# Patient Record
Sex: FEMALE | Race: WHITE
[De-identification: names, ages, dates, MRNs, and addresses within clinical notes are randomized per-mention and may not be internally consistent; named-entity substitution may affect disease eponyms.]

---

## 2021-01-19 NOTE — R.PREADM
PRE-ADMISSION SCREENING FORM



SCREENING DATE AND TIME



2021 10:12 (CST) 



ANTICIPATED REHAB ADMISSION DATE



2021 



REFERRING FACILITY



[facility] 



REFERRAL DATE AND TIME



2021 10:12 (CST) 



REFERRAL OFFICE PHONE



[phone] 



ACUTE ADMIT DATE



2021 





Previous Rehabilitation(s):





No.



ACUTE /DC PLANNER



AVANI REYNA 



ATTENDING PHYSICIAN



RODOLFO WISE 



REFERRING PHYSICIAN



RODOLFO WISE 



PRIMARY CARE PHYSICIAN



RODOLFO WISE



REHAB FACILITY



[facility] 



CLINICAL LIAISON



Noemy Ram 



PHYSICIAN REVIEWER



Dr. Jordy Lomeli M.D. 



MR#



[id number] 



ACCT#



[id number] 



NAME



BEHNKE, MARY 



ADDRESS



[address] 



PHONE



( 



ZIP



[id number] 



DATE OF BIRTH



1940 



AGE



80 



SSN#



[id number] 



GENDER



female 



MARITAL STATUS



 



RACE



unknown race 



ADMIT FROM



02 - [facility] [[email or web address] 



PRE-HOSPITAL LIVING SETTING



01 - Home (private home/apt. board/care, assisted living, group home, transitional living) 



HOME TYPE AND DETAILS



Type of home: single family house

# of levels in the residence: 1

# of steps within the residence: 0

# of steps to enter the residence: 0



PRE-HOSPITAL LIVING WITH



Family/Relatives 



FAMILY SUPPORT



Yes 



PRIMARY FAMILY CONTACT NAME



CHANEL BANEGAS 



PRIMARY FAMILY CONTACT PHONE



[phone] 



PRIMARY FAMILY CONTACT RELATIONSHIP



DAUGHTER 



PHONE PRIMARY FAMILY CONTACT ON ADM.?



no 



IS PRIMARY FAMILY CONTACT AUTH. REP.?



no 



1ST EMERGENCY CONTACT



CHANEL BANEGAS 



1ST CONTACT PHONE



[phone] 



1ST CONTACT RELATIONSHIP



DAUGHTER 



PHONE 1ST CONTACT ON ADM.



no 



IS 1ST CONTACT AUTH. REP.?



no 



PHONE 2ND CONTACT ON ADM.?



no 



PATIENT EMPLOYMENT STATUS



Retired (for age) 



PATIENT EMPLOYER



No Employer 



PAYOR INFORMATION:



1ST PAYOR NAME



[employer] 



1ST PAYOR PHONE



[phone] 



1ST PAYOR POLICY ID



[id number] 



INJURY/ILLNESS DUE TO ACCIDENT?



No 



ANOTHER PARTY RESPONSIBLE?



No 



PRIMARY REHAB/ACUTE DIAGNOSIS:



Post COVID, falls, debilitated and parkinsons exas.



ONSET DATE



2021



REHAB IMPAIRMENT CATEGORY (JENNIFER):



00 No JENNIFER does NOT meet 60% rule



PRIMARY DIAGNOSIS-RELATED SURGERIES:



N/A



SUMMARY OF ACUTE HOSPITALIZATION:



Pt. is a 79 yo Right-handed female of unknown race.

On 2021 she was admitted to [facility] with diagnosis Post COVID, falls, debilitated an
d parkinsons exas..

Her impairment category is Pulmonary Disorders 10 -  Other Pulmonary Disorders (10.9).

Pre-morbidly, Pt. was independent/mod-I in Safety Awareness, Balance, Self-Care, and Endurance; and s
he had good Locomotion and Sphincter Control.

Currently, she has deficits of Locomotion, Safety Awareness, Balance, Endurance, and Sphincter Contro
l.

Pt. is now referred to [facility] for acute in-patient rehabilitation in order
 to maximize patient's functional independence in activities of daily living, strength, ROM, and mobi
lity.

Patient has realistic goal of being discharged at assistance level 7-Ind to reside at Home with  Fami
ly/Relatives.

Mary Behnke is a 87 year- old female that lives at home

independently. She lives in a single story home with

family help when needed. She has a history Parkinsonism, chronic kidney

disease, diabetes, dis degeneration,spinal stenosis, lumbar region w out

neurogenic vonda, and is dependant on dialysis along w having covid -19 (21).

COVID-19 has made her weak and not able to get out and do her normal

activivites. She has become weak and had multiple falls. She has been working with

home health therapy to [facility] but its not enough at this time.The

patient would most definitely benefit from acute inpatient rehab and has become

severely debilitated and unable to live at her prior level of activity at home

getting her stronger to be back living at home independently is our goal. It is

reasonable and necessary for the patient to come to acute inpatient rehab for

approximately 7-10 days in order to return to her prior level of care. She is

now being transferred to [facility] Inpatient rehabilitation and is

medically stable with relatively stable labs.

She is now medically stable but in need of 24  hour nursing, doctor

supervision and oversight while receiving active and ongoing intensive (PT, OT



therapy a day/15 hours per week and receive care with intensive

interdisciplinary approach. COVID-19 screening performed; spoke with patient

via phone. Patient denies new onset of fever, cough, difficulty breathing, sore

throat, body aches and non-allergy nasal congestion in the past 24 hours.

Patient denies travel outside of [place] in the past 14 days. Patient denies any

contact with someone who has a confirmed diagnosis of or is under investigation

for COVID-19 in the past 14 days.



PAST MEDICAL HISTORY



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

Hypertensive chronic kidney disease with stage 1 through stage 4 chronic kidney disease, or unspecifi
ed chronic kidney disease (I12.9)

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

Chronic kidney disease, stage 4 (severe) (N18.4)

Other cervical disc degeneration, unspecified cervical region (M50.30)

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

DEPENDENCE ON RENAL DIALYSIS

LONG TERM (CURRENT)  USE OF    ORAL HYPOGLYCEMIC DRUGS

COVID- 19                21/



MEDICATION ALLERGIES:



PCN

SULFA



ENVIRONMENTAL ALLERGIES:



- Substance Allergies

None Known

- Other Allergies

None Known



CODE STATUS:



Full code



BMI



N/A 



DIET:



- Diet Type

Regular

- Diet - Solid Texture

Regular

- Diet - Liquid Texture

Regular

- Tube Feed

N/A



REVIEW OF SYSTEMS:



- Gen

Alert and awake

Lying in bed

No apparent distress

Oriented to: person, time, and place

- Vital Signs

SBP/DBP: 132/70

Pulse: 74

Resp: 18

Vital signs stable, afebrile

- CVS

RRR



VITAL SIGNS



SBP/DBP: 132/70

Pulse: 74

Resp: 18

Vital signs stable, afebrile



MEDICATIONS/TREATMENT:



Other-  See attached MAR (Medication Administration Record).



CURRENT SPHINCTER CONTROL:



Pre-hospital bladder status: unspecified

# of bladder accidents in the last 7 days prior to screenin

Pre-hospital bowel status: unspecified

# of bowel accidents in the last 7 days prior to screenin

Last Bowel Movement Date: 2021



CURRENT LOCOMOTION STATUS:



distance walked 40  feet WITH ROLLING WALKER



DETAILED CURRENT FUNCTIONAL STATUS:



- Bladder

accident frequency: Ind - No accidents in the past 7 days

- Bowel

accident frequency: Ind - No accidents in the past 7 days

- Walking

score based on distance walked: 0(N/A)

score based on distance walked: 1(<=50ft)

- Wheelchair

score based on distance traveled: 0(N/A)



QI SCORES:



- Self-Care

A. Eating  03-Partial/moderate assistance  

B. Oral hygiene  03-Partial/moderate assistance  

C. Toileting hygiene  03-Partial/moderate assistance  

E. Shower/bathe self  03-Partial/moderate assistance  

F. Upper body dressing  03-Partial/moderate assistance  

G. Lower body dressing  03-Partial/moderate assistance  

H. Putting on/taking off footwear  88-Not attempted due to medical condition or safety concerns  

- Mobility

A. Roll left and right  03-Partial/moderate assistance  

B. Sit to lying  03-Partial/moderate assistance  

C. Lying to sitting on side of bed  03-Partial/moderate assistance  

D. Sit to stand  03-Partial/moderate assistance  

E. Chair/bed-to-chair transfer  03-Partial/moderate assistance  

F. Toilet transfer  03-Partial/moderate assistance  

G. Car transfer  88-Not attempted due to medical condition or safety concerns  

I. Walk 10 feet  03-Partial/moderate assistance  

J. Walk 50 feet with two turns  88-Not attempted due to medical condition or safety concerns  

K. Walk 150 feet  88-Not attempted due to medical condition or safety concerns  

L. Walking 10 feet on uneven surfaces  88-Not attempted due to medical condition or safety concerns  


M. 1 step (curb)  88-Not attempted due to medical condition or safety concerns  

N. 4 steps  88-Not attempted due to medical condition or safety concerns  

O. 12 steps  88-Not attempted due to medical condition or safety concerns  

P. Picking up object  88-Not attempted due to medical condition or safety concerns  

R. Wheel 50 feet with two turns  88-Not attempted due to medical condition or safety concerns  

S. Wheel 150 feet  88-Not attempted due to medical condition or safety concerns  

- Bladder and Bowel

Bladder continence      

Bowel continence      

- Endurance

Fair

- Balance

Fair

- Safety Awareness

Fair



CURRENT FUNC. DEFICITS:



Self-Care, Mobility, Endurance, Balance, and Safety Awareness



CURRENT / PREVIOUS ASSISTIVE DEVICES:



Rolling Walker





HISTORY OF FALLS. HAS THE PATIENT HAD TWO OR MORE FALLS IN THE PAST YEAR OR ANY FALL WITH INJURY IN T
HE PAST YEAR?:



Yes 



PRIOR SURGERY. DID THE PATIENT HAVE MAJOR SURGERY DURING  DAYS PRIOR TO ADMISSION?:



No 



THERAPY NOTES FROM ACUTE CARE:



Attached.



SPECIAL NEEDS:



- Safety Concerns

Skin breakdown precautions needed due to skin breakdown risk



PATIENT NEEDS ACTIVE AND ONGOING THERAPEUTIC INTERVENTION OF MULTIPLE THERAPY DISCIPLINES, INCLUDING:




- Dietary and Nutrition

Adequate Nutrition. Nutritional Education. Nutritional Supplements. 



- Speech Therapy

Memory Strategies. Speech Intelligibility Training. 



PATIENT NEEDS CLOSE MEDICAL SUPERVISION BY A REHABILITATION PHYSICIAN FOR:



Coordination of Treatment Team



PATIENT REQUIRES 24X7 REHAB NURSING FOR MEDICAL AND FUNCTIONAL MGT. OF THE FOLLOWING DEFICITS:



Disease Management

Medication Management

Patient/Family Education

Providing Safe Environment



PATIENT REQUIRES INTENSIVE, COORDINATED INTERDISCIPLINARY APPROACH TO REHAB:



Arranging Home Equipment/Services

Discharge Planning

Family Intervention/Training

/Case Management



PATIENT REHAB POTENTIAL:



M. BEHNKE is able and expected to receive 3 hours of individualized therapy daily on at least 5 of ev
marlen 7 days

M. BEHNKE's prognosis for significant practical improvement within a reasonable period of time appear
s Good

Expected level of measurable improvement will be of a practical value to M. BEHNKE's functional capac
ity or adaptations to impairments

Has a viable Discharge Plan

Medically appropriate; condition is sufficiently stable to participate in intensive rehab program



DISCHARGE PLAN:



- Estimated Length of Stay (days)

14. 



- Consensus on plan

Discharge plan has been discussed with primary caregiver. Patient/Family is in agreement with the samra
n. Primary caregiver is in agreement with the plan. 



- Patient/Family Goals

Return home independently. 



- Planned Living Setting Upon Discharge

Home, to live with Family/Relatives. Transitional Living. 



RECOMMENDED CARE LEVEL:



IRF



RECOMMENDATION DETAILS:



Recommended Admission to Comprehensive Rehabilitation Program to Increase Functional [place]



SCREENER'S COMPLETENESS CONFIRMATION:



- Screening Confirmation

The patient data collection on this preadmission screening form is finished



PHYSICIANS REVIEW AND ADMISSION DETERMINATION



Admit - Based on my review of the Pre-Admission Screening results, in my medical judgment and experie
nce, I concur with the findings and recommend admission to [facility], as this
 patient requires an IRF level of care.



SIGNATURE PANEL:



 - [electronically] signed by Noemy Ram on 2021 at 11:55 (CST)

 - [electronically] signed by Ryan Pearl PT on 2021 at 12:04 (CST)

Physician Reviewer - [electronically] signed by Dr. Jordy Lomeli M.D. on 2021 at 17:19 (CST
)

## 2021-01-20 ENCOUNTER — HOSPITAL ENCOUNTER (INPATIENT)
Dept: HOSPITAL 97 - 5TH | Age: 81
LOS: 11 days | Discharge: HOME HEALTH | DRG: 948 | End: 2021-01-31
Attending: PSYCHIATRY & NEUROLOGY | Admitting: PSYCHIATRY & NEUROLOGY
Payer: COMMERCIAL

## 2021-01-20 VITALS — BODY MASS INDEX: 23.3 KG/M2

## 2021-01-20 DIAGNOSIS — Z79.82: ICD-10-CM

## 2021-01-20 DIAGNOSIS — F05: ICD-10-CM

## 2021-01-20 DIAGNOSIS — N17.9: ICD-10-CM

## 2021-01-20 DIAGNOSIS — G21.4: ICD-10-CM

## 2021-01-20 DIAGNOSIS — E11.22: ICD-10-CM

## 2021-01-20 DIAGNOSIS — Z20.822: ICD-10-CM

## 2021-01-20 DIAGNOSIS — R53.81: Primary | ICD-10-CM

## 2021-01-20 DIAGNOSIS — Z79.890: ICD-10-CM

## 2021-01-20 DIAGNOSIS — N18.9: ICD-10-CM

## 2021-01-20 DIAGNOSIS — Z88.1: ICD-10-CM

## 2021-01-20 DIAGNOSIS — E87.3: ICD-10-CM

## 2021-01-20 DIAGNOSIS — Z79.84: ICD-10-CM

## 2021-01-20 DIAGNOSIS — E78.5: ICD-10-CM

## 2021-01-20 DIAGNOSIS — Z79.899: ICD-10-CM

## 2021-01-20 DIAGNOSIS — Z88.0: ICD-10-CM

## 2021-01-20 DIAGNOSIS — D63.1: ICD-10-CM

## 2021-01-20 DIAGNOSIS — Z86.16: ICD-10-CM

## 2021-01-20 DIAGNOSIS — E11.40: ICD-10-CM

## 2021-01-20 DIAGNOSIS — Z99.2: ICD-10-CM

## 2021-01-20 DIAGNOSIS — I12.9: ICD-10-CM

## 2021-01-20 LAB — UA DIPSTICK W REFLEX MICRO PNL UR: (no result)

## 2021-01-20 PROCEDURE — 97110 THERAPEUTIC EXERCISES: CPT

## 2021-01-20 PROCEDURE — 90935 HEMODIALYSIS ONE EVALUATION: CPT

## 2021-01-20 PROCEDURE — 85025 COMPLETE CBC W/AUTO DIFF WBC: CPT

## 2021-01-20 PROCEDURE — 87340 HEPATITIS B SURFACE AG IA: CPT

## 2021-01-20 PROCEDURE — 97112 NEUROMUSCULAR REEDUCATION: CPT

## 2021-01-20 PROCEDURE — 83735 ASSAY OF MAGNESIUM: CPT

## 2021-01-20 PROCEDURE — 97530 THERAPEUTIC ACTIVITIES: CPT

## 2021-01-20 PROCEDURE — 71045 X-RAY EXAM CHEST 1 VIEW: CPT

## 2021-01-20 PROCEDURE — 81015 MICROSCOPIC EXAM OF URINE: CPT

## 2021-01-20 PROCEDURE — 86317 IMMUNOASSAY INFECTIOUS AGENT: CPT

## 2021-01-20 PROCEDURE — 81003 URINALYSIS AUTO W/O SCOPE: CPT

## 2021-01-20 PROCEDURE — 97116 GAIT TRAINING THERAPY: CPT

## 2021-01-20 PROCEDURE — 82947 ASSAY GLUCOSE BLOOD QUANT: CPT

## 2021-01-20 PROCEDURE — 36415 COLL VENOUS BLD VENIPUNCTURE: CPT

## 2021-01-20 PROCEDURE — 87186 SC STD MICRODIL/AGAR DIL: CPT

## 2021-01-20 PROCEDURE — 80048 BASIC METABOLIC PNL TOTAL CA: CPT

## 2021-01-20 PROCEDURE — 82040 ASSAY OF SERUM ALBUMIN: CPT

## 2021-01-20 PROCEDURE — 87086 URINE CULTURE/COLONY COUNT: CPT

## 2021-01-20 PROCEDURE — 87077 CULTURE AEROBIC IDENTIFY: CPT

## 2021-01-20 PROCEDURE — 84134 ASSAY OF PREALBUMIN: CPT

## 2021-01-20 PROCEDURE — 97542 WHEELCHAIR MNGMENT TRAINING: CPT

## 2021-01-20 PROCEDURE — 97127: CPT

## 2021-01-20 PROCEDURE — 86709 HEPATITIS A IGM ANTIBODY: CPT

## 2021-01-20 PROCEDURE — 87088 URINE BACTERIA CULTURE: CPT

## 2021-01-20 PROCEDURE — 97161 PT EVAL LOW COMPLEX 20 MIN: CPT

## 2021-01-20 PROCEDURE — 92523 SPEECH SOUND LANG COMPREHEN: CPT

## 2021-01-20 RX ADMIN — Medication SCH CAP: at 22:41

## 2021-01-20 RX ADMIN — ATORVASTATIN CALCIUM SCH MG: 20 TABLET, FILM COATED ORAL at 22:40

## 2021-01-20 RX ADMIN — MEMANTINE HYDROCHLORIDE SCH MG: 10 TABLET ORAL at 22:41

## 2021-01-20 RX ADMIN — Medication SCH CAP: at 22:42

## 2021-01-20 RX ADMIN — HUMAN INSULIN SCH: 100 INJECTION, SOLUTION SUBCUTANEOUS at 21:00

## 2021-01-20 RX ADMIN — MONTELUKAST SODIUM SCH MG: 10 TABLET, FILM COATED ORAL at 22:40

## 2021-01-20 RX ADMIN — ROPINIROLE SCH MG: 1 TABLET ORAL at 22:40

## 2021-01-20 RX ADMIN — GUAIFENESIN SCH MG: 100 SOLUTION ORAL at 22:40

## 2021-01-21 LAB
ALBUMIN SERPL BCP-MCNC: 2.7 G/DL (ref 3.4–5)
BUN BLD-MCNC: 23 MG/DL (ref 7–18)
GLUCOSE SERPLBLD-MCNC: 91 MG/DL (ref 74–106)
HCT VFR BLD CALC: 25.8 % (ref 36–45)
LYMPHOCYTES # SPEC AUTO: 0.7 K/UL (ref 0.7–4.9)
MAGNESIUM SERPL-MCNC: 2.6 MG/DL (ref 1.8–2.4)
MORPHOLOGY BLD-IMP: (no result)
PMV BLD: 7.1 FL (ref 7.6–11.3)
POTASSIUM SERPL-SCNC: 4 MMOL/L (ref 3.5–5.1)
PREALB SERPL-MCNC: 14.9 MG/DL (ref 20–40)
RBC # BLD: 2.72 M/UL (ref 3.86–4.86)

## 2021-01-21 RX ADMIN — MEMANTINE HYDROCHLORIDE SCH MG: 10 TABLET ORAL at 19:43

## 2021-01-21 RX ADMIN — Medication SCH CAP: at 09:44

## 2021-01-21 RX ADMIN — HUMAN INSULIN SCH: 100 INJECTION, SOLUTION SUBCUTANEOUS at 11:30

## 2021-01-21 RX ADMIN — HUMAN INSULIN SCH: 100 INJECTION, SOLUTION SUBCUTANEOUS at 20:00

## 2021-01-21 RX ADMIN — MONTELUKAST SODIUM SCH MG: 10 TABLET, FILM COATED ORAL at 19:43

## 2021-01-21 RX ADMIN — LORATADINE SCH MG: 10 TABLET ORAL at 17:40

## 2021-01-21 RX ADMIN — GUAIFENESIN SCH MG: 100 SOLUTION ORAL at 19:44

## 2021-01-21 RX ADMIN — IRON SUPPLEMENT SCH MG: 325 TABLET ORAL at 09:43

## 2021-01-21 RX ADMIN — Medication SCH TAB: at 12:57

## 2021-01-21 RX ADMIN — Medication SCH PUFF: at 08:00

## 2021-01-21 RX ADMIN — Medication SCH TAB: at 09:44

## 2021-01-21 RX ADMIN — Medication SCH: at 17:00

## 2021-01-21 RX ADMIN — ATORVASTATIN CALCIUM SCH MG: 20 TABLET, FILM COATED ORAL at 19:44

## 2021-01-21 RX ADMIN — Medication SCH CAP: at 09:43

## 2021-01-21 RX ADMIN — ASPIRIN SCH MG: 81 TABLET, COATED ORAL at 12:58

## 2021-01-21 RX ADMIN — ROPINIROLE SCH MG: 1 TABLET ORAL at 09:42

## 2021-01-21 RX ADMIN — Medication SCH CAP: at 17:00

## 2021-01-21 RX ADMIN — HUMAN INSULIN SCH: 100 INJECTION, SOLUTION SUBCUTANEOUS at 07:30

## 2021-01-21 RX ADMIN — ROPINIROLE SCH MG: 1 TABLET ORAL at 19:43

## 2021-01-21 RX ADMIN — MEMANTINE HYDROCHLORIDE SCH MG: 10 TABLET ORAL at 12:58

## 2021-01-21 RX ADMIN — TRAZODONE HYDROCHLORIDE PRN MG: 50 TABLET ORAL at 22:11

## 2021-01-21 RX ADMIN — Medication SCH CAP: at 19:02

## 2021-01-21 RX ADMIN — TRAZODONE HYDROCHLORIDE PRN MG: 50 TABLET ORAL at 23:45

## 2021-01-21 RX ADMIN — LEVOTHYROXINE SODIUM SCH MG: 0.1 TABLET ORAL at 07:25

## 2021-01-21 RX ADMIN — ROPINIROLE SCH MG: 1 TABLET ORAL at 13:59

## 2021-01-21 NOTE — R.HP
HISTORY AND PHYSICAL



FACILITY:



[facility]



ENCOUNTER DATE AND TIME:



01/21/2021 18:09 (CST)



MR#:



[id number]



ACCT#:



[id number]



NAME



BEHNKE, MARY



ADDRESS:



[address]



CITY:



[place]



ZIP



[address]



PHONE:



(



YOB: 1940



AGE:



80



SSN#



[id number]



GENDER:



Female



DEXTERITY



Right-handed



MARITAL STATUS







RACE



Unknown race



PRE-HOSPITAL LIVING SETTING



01 - Home (private home/apt. board/care, assisted living, group home, transitional living) 



PRE-HOSPITAL LIVING WITH



Family/Relatives 



ENCOUNTER PHYSICIAN:



Dr. Jordy Lomeli M.D.



REFERRING DOCTOR:



RODOLFO WISE



DATE OF ADMISSION:



01/20/2021 13:40 (CST)



REFERRING FACILITY



[facility] 



PRIMARY CARE PHYSICIAN



RODOLFO WISE 



HOME TYPE AND DETAILS:



Type of home: single family house

# of levels in the residence: 1

# of steps within the residence: 0

# of steps to enter the residence: 0



ONSET DATE:



01/04/2021



PRIMARY DIAGNOSIS-RELATED SURGERIES:



N/A



HISTORY OF PRESENT ILLNESS (HPI):



Pt. is a 79 yo Right-handed female of unknown race.

On 01/20/2021 she was admitted to [facility] with diagnosis Post COVID, falls, debilitated an
d parkinsons exas..

Her impairment category is Pulmonary Disorders 10 -  Other Pulmonary Disorders (10.9).

Pre-morbidly, Pt. was independent/mod-I in Safety Awareness, Balance, Self-Care, and Endurance; and s
he had good Locomotion and Sphincter Control.

Currently, she has deficits of Locomotion, Safety Awareness, Balance, Endurance, and Sphincter Contro
l.

Pt. is now referred to [facility] for acute in-patient rehabilitation in order
 to maximize patient's functional independence in activities of daily living, strength, ROM, and mobi
lity.

Patient has realistic goal of being discharged at assistance level 7-Ind to reside at Home with  Fami
ly/Relatives.

Mary Behnke is a 87 year- old female that lives at home

independently. She lives in a single story home with

family help when needed. She has a history Parkinsonism, chronic kidney

disease, diabetes, dis degeneration,spinal stenosis, lumbar region w out

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

COVID-19 has made her weak and not able to get out and do her normal

activivites. She has become weak and had multiple falls. She has been working with

home health therapy to [facility] but its not enough at this time.The

patient would most definitely benefit from acute inpatient rehab and has become

severely debilitated and unable to live at her prior level of activity at home

getting her stronger to be back living at home independently is our goal. It is

reasonable and necessary for the patient to come to acute inpatient rehab for

approximately 7-10 days in order to return to her prior level of care. She is

now being transferred to [facility] Inpatient rehabilitation and is

medically stable with relatively stable labs.

She is now medically stable but in need of 24  hour nursing, doctor

supervision and oversight while receiving active and ongoing intensive (PT, OT



therapy a day/15 hours per week and receive care with intensive

interdisciplinary approach. COVID-19 screening performed; spoke with patient

via phone. Patient denies new onset of fever, cough, difficulty breathing, sore

throat, body aches and non-allergy nasal congestion in the past 24 hours.

Patient denies travel outside of [place] in the past 14 days. Patient denies any

contact with someone who has a confirmed diagnosis of or is under investigation

for COVID-19 in the past 14 days.



MEDICATION ALLERGIES:



PCN

SULFA



ENVIRONMENTAL ALLERGIES:



- Substance Allergies

None Known

- Other Allergies

None Known



PAST MEDICAL HISTORY:



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

Hypertensive chronic kidney disease with stage 1 through stage 4 chronic kidney disease, or unspecifi
ed chronic kidney disease (I12.9)

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

Chronic kidney disease, stage 4 (severe) (N18.4)

Other cervical disc degeneration, unspecified cervical region (M50.30)

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

DEPENDENCE ON RENAL DIALYSIS

LONG TERM (CURRENT)  USE OF    ORAL HYPOGLYCEMIC DRUGS

COVID- 19                1/4/21/



SOCIAL HISTORY:



- Home Living

Family/Relatives



REVIEW OF SYSTEMS:



- Gen

No Chills

Fatigue

No Fever

- Eyes

No Double Vision

No itchiness

- ENMT

No Difficulty Swallowing

- CVS

No Chest Discomfort

No Chest Pain

Fatigue

No Weight Gain

- Resp

No Cough

No Shortness of Breath

- GI

Continent

No Abdominal Pain

No Constipation

No Diarrhea

- 

Continent

No Kidney Pain

No Painful Urination

No Urinary Urgency

- MSK

No Joint Pain

Muscle Cramps

Stiffness

- Skin

No Itching

No Rash

No Suspicious Lesions

- Neuro

Coordination Difficulty

No Difficulty with Concentration

No Memory Loss

No Seizures

Weakness

- Psych

No Anxiety

No Depression



No HIV Exposure

No Persistent Infections

No Seasonal Allergies

- Endo

No Cold/Heat Intolerance

No Excessive Hunger

No Excessive Thirst

No Excessive Urination



PHYSICAL EXAM



- Gen

Alert and awake

Lying in bed

No apparent distress

Oriented to: person, time, and place

- Skin

No skin breakdown.



Normacephalic

- Eyes

No abnormalities

- ENMT

No abnormalities

- Neck

No abnormalities



No cervical adenopathy

- CVS

RRR

- Chest

No abnormalities

- Resp

Clear to auscultation

- Abd

Soft

- GI

Non distended



Deferred

- 

No abnormalities

- Ext

No significant edema

- MSK

4+/5 weakness in both lower extremities.

- Neuro

No focal deficits

- Psych

No abnormalities



VITAL SIGNS



SBP/DBP: 132/70

Temperature: 99.5 F

Pulse: 74

Resp: 16



NURSING:



- Shower

allowing shower



ACTIVITIES



OOB only with supervision



QI SCORES:



- Self-Care

A. Eating  03-Partial/moderate assistance  

B. Oral hygiene  03-Partial/moderate assistance  

C. Toileting hygiene  03-Partial/moderate assistance  

E. Shower/bathe self  03-Partial/moderate assistance  

F. Upper body dressing  03-Partial/moderate assistance  

G. Lower body dressing  03-Partial/moderate assistance  

H. Putting on/taking off footwear  88-Not attempted due to medical condition or safety concerns  

- Mobility

A. Roll left and right  03-Partial/moderate assistance  

B. Sit to lying  03-Partial/moderate assistance  

C. Lying to sitting on side of bed  03-Partial/moderate assistance  

D. Sit to stand  03-Partial/moderate assistance  

E. Chair/bed-to-chair transfer  03-Partial/moderate assistance  

F. Toilet transfer  03-Partial/moderate assistance  

G. Car transfer  88-Not attempted due to medical condition or safety concerns  

I. Walk 10 feet  03-Partial/moderate assistance  

J. Walk 50 feet with two turns  88-Not attempted due to medical condition or safety concerns  

K. Walk 150 feet  88-Not attempted due to medical condition or safety concerns  

L. Walking 10 feet on uneven surfaces  88-Not attempted due to medical condition or safety concerns  


M. 1 step (curb)  88-Not attempted due to medical condition or safety concerns  

N. 4 steps  88-Not attempted due to medical condition or safety concerns  

O. 12 steps  88-Not attempted due to medical condition or safety concerns  

P. Picking up object  88-Not attempted due to medical condition or safety concerns  

R. Wheel 50 feet with two turns  88-Not attempted due to medical condition or safety concerns  

S. Wheel 150 feet  88-Not attempted due to medical condition or safety concerns  

- Bladder and Bowel

Bladder continence      

Bowel continence      

- Endurance

Fair

- Balance

Fair

- Safety Awareness

Fair



CURRENT FUNC. DEFICITS:



Self-Care, Mobility, Endurance, Balance, and Safety Awareness



MEDICATIONS:



- Other

See attached MAR (Medication Administration Record)



ASSESSMENT:



Pt. is a 79 yo Right-handed female of unknown race.On 01/20/2021 she was admitted to [facility] with diagnosis Post COVID, falls, debilitated and parkinsons exas..Her impairment category is Pulm
onary Disorders 10 -  Other Pulmonary Disorders (10.9).Pre-morbidly, Pt. was independent/mod-I in Saf
ety Awareness, Balance, Self-Care, and Endurance; and she had good Locomotion and Sphincter Control.C
urrently, she has deficits of Locomotion, Safety Awareness, Balance, Endurance, and Sphincter Control
.Pt. is now referred to [facility] for acute in-patient rehabilitation in  to maximize patient's functional independence in activities of daily living, strength, ROM, and mob
ility.- Rehab Goal

Patient has realistic goal of being discharged at assistance level 7-Ind to reside at Home with  Fami
ly/Relatives.

Mary Behnke is a 87 year- old female that lives at home

independently. She lives in a single story home with

family help when needed. She has a history Parkinsonism, chronic kidney

disease, diabetes, dis degeneration,spinal stenosis, lumbar region w out

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

COVID-19 has made her weak and not able to get out and do her normal

activivites. She has become weak and had multiple falls. She has been working with

home health therapy to [facility] but its not enough at this time.The

patient would most definitely benefit from acute inpatient rehab and has become

severely debilitated and unable to live at her prior level of activity at home

getting her stronger to be back living at home independently is our goal. It is

reasonable and necessary for the patient to come to acute inpatient rehab for

approximately 7-10 days in order to return to her prior level of care. She is

now being transferred to [facility] Inpatient rehabilitation and is

medically stable with relatively stable labs.

She is now medically stable but in need of 24  hour nursing, doctor

supervision and oversight while receiving active and ongoing intensive (PT, OT



therapy a day/15 hours per week and receive care with intensive

interdisciplinary approach. COVID-19 screening performed; spoke with patient

via phone. Patient denies new onset of fever, cough, difficulty breathing, sore

throat, body aches and non-allergy nasal congestion in the past 24 hours.

Patient denies travel outside of [place] in the past 14 days. Patient denies any

contact with someone who has a confirmed diagnosis of or is under investigation

for COVID-19 in the past 14 days.REHAB PLAN:



- Physical Therapy

Gait dysfunction - to improve, our physical therapists will perform initial evaluation of pt's status
 upon admission and devise an individualized program for Gait Training, and Wheel Chair mobility

Need for home safety evaluation - to improve, our physical therapists will perform initial evaluation
 of pt's status upon admission and devise an individualized program for Home Evaluation

Need in caregiver upon discharge - to improve, our physical therapists will perform initial evaluatio
n of pt's status upon admission and devise an individualized program for Caregiver Training

New precaution - to improve, our physical therapists will perform initial evaluation of pt's status u
sheldon admission and devise an individualized program for Patient precaution education

 Edema - to improve, our physical therapists will perform initial evaluation of pt's status upon admi
ssion and devise an individualized program for Elevation Training, and Lymphedema Therapy

Poor balance - to improve, our physical therapists will perform initial evaluation of pt's status upo
n admission and devise an individualized program for Balance Training

Poor endurance - to improve, our physical therapists will perform initial evaluation of pt's status u
sheldon admission and devise an individualized program for Endurance Training

Weakness - to improve, our physical therapists will perform initial evaluation of pt's status upon ad
mission and devise an individualized program for Aquatic Therapy, Neuromuscular Reeducation, and Stre
ngthening

 Achieving independence - to improve, our physical therapists will perform initial evaluation of pt's
 status upon admission and devise an individualized program for Community Reintegration Activities

- Occupational Therapy

Need for care giver - to improve, our occupation therapists will perform initial evaluation of pt's s
tatus upon admission and devise an individualized program for Caregiver Training

Weakness - to improve, our occupation therapists will perform initial evaluation of pt's status upon 
admission and devise an individualized program for Aquatic Therapy, Balance, Endurance, UE ROM, and U
E strengthening



MEDICAL PLAN:



- Diet Type

Start Regular

- Diet - Liquid Texture

Start Regular

- Tube Feed

Start N/A

- Other

See attached MAR (Medication Administration Record)

- Diet - Solid Texture

Regular

- Shower

 shower



DISCHARGE PLAN:



- Estimated Length of Stay (days)

14. 



- Consensus on plan

Discharge plan has been discussed with primary caregiver. Patient/Family is in agreement with the samra
n. Primary caregiver is in agreement with the plan. 



- Patient/Family Goals

Return home independently. 



- Planned Living Setting Upon Discharge

Home, to live with Family/Relatives. Transitional Living. 



SIGNATURE PANEL:



Electronically signed by Dr. Jordy Lomeli M.D. on 01/21/2021 at 18:12 (CST)

## 2021-01-21 NOTE — CON
Date of Consultation:  01/21/2021



Reason For Consultation:  Elevated BUN and creatinine, hemodialysis, alkalosis.



History Of Present Illness:  This is an 80-year-old female with significant past
medical history of diabetes complicated with neuropathy, hypertension, 
hyperlipidemia, the patient recently had acute kidney injury, started on 
dialysis for the last 2 months, secondary to cardiorenal.  The patient has been 
dialyzed TTS at [facility] through right IJ PermCath.  The 
patient had deconditioning, weakness.  For that reason admitted to the rehab.  
The patient denied any chest pain, any nausea, any vomiting.  We have been 
consulted for hemodialysis. 



Labs show creatinine started coming down, but has alkalosis.



Past Medical History:  Includes,

1.   Hypertension.

2.   Hyperlipidemia.

3.   Diabetes.

4.   Chronic kidney disease with acute kidney injury.



Allergies:  PENICILLIN AND SULFA.



Current Medications:  The patient on include,

1.   Tylenol.

2.   Pantoprazole.



Social History:  Denies smoking.  Denies drinking.  Denies drug abuse.



Family History:  Positive for hypertension.



Past Surgical History:  Includes PermCath placement.



Review of Systems:

Head and Neck:  No red eye.  No ear pain. 

GI:  No nausea.  No vomiting. 

:  No polyuria.  No dysuria.  No hematuria. 

GYN:  No vaginal discharge. 

Respiratory:  No shortness of breath. 

Cardiovascular:  No chest pain. 

Endocrine:  No polydipsia. 

Skin:  No rash. 

Musculoskeletal:  Generalized weakness. 

Neuro:  Has neuropathy.



Physical Examination:

Vital Signs:  When I saw the patient blood pressure 124/24.  Pulse of 71, 
afebrile. 

Chest:  Clear to auscultation. 

Heart:  S1, S2.  Systolic murmur. 

Abdomen:  Soft, nontender. 

Extremities:  No edema. 

Neuro:  Alert and oriented x3.  No focal.



Laboratory Data:  For the patient, hemoglobin 8.6, hematocrit 25.6.  Sodium 139,
potassium 4, bicarb 30, creatinine 1.9, BUN 23.  GFR of 24.



Assessment And Plan:  

1.   Acute kidney injury secondary to cardiorenal, on the recovery, nonoliguric.
 We will do dialysis today.  Then we will consider holding dialysis and watching
for recovery with I's and O's and we will monitor.  We will collect 24-hour 
urine to evaluate the clearance of the patient and the volume.

2.   We will discuss the case with her primary nephrologist, Dr. Doshi.

3.   Hypertension, controlled, optimal, continue current medication.

4.   Anemia of chronic kidney disease.  Resume ETHAN.  We will send for anemia 
workup.

5.   Diabetes as by primary.

6.   Deconditioning.  Continue PT, OT.



time spend discussing with the patient face to face , placing order , discusse 
with  the patient and other team member including hospitalist 75 min.

TEENA

DD:  01/21/2021 20:07:22   Voice ID:  [id number]

DT:  01/21/2021 20:30:29   Report ID:  [id number]

CORI

## 2021-01-22 RX ADMIN — MONTELUKAST SODIUM SCH MG: 10 TABLET, FILM COATED ORAL at 21:03

## 2021-01-22 RX ADMIN — IRON SUPPLEMENT SCH MG: 325 TABLET ORAL at 08:03

## 2021-01-22 RX ADMIN — ASPIRIN SCH MG: 81 TABLET, COATED ORAL at 12:24

## 2021-01-22 RX ADMIN — Medication SCH TAB: at 08:04

## 2021-01-22 RX ADMIN — Medication SCH: at 17:00

## 2021-01-22 RX ADMIN — Medication SCH CAP: at 08:06

## 2021-01-22 RX ADMIN — ATORVASTATIN CALCIUM SCH MG: 20 TABLET, FILM COATED ORAL at 21:01

## 2021-01-22 RX ADMIN — ROPINIROLE SCH MG: 1 TABLET ORAL at 21:02

## 2021-01-22 RX ADMIN — ENOXAPARIN SODIUM SCH MG: 30 INJECTION SUBCUTANEOUS at 18:11

## 2021-01-22 RX ADMIN — GUAIFENESIN SCH MG: 100 SOLUTION ORAL at 21:02

## 2021-01-22 RX ADMIN — Medication SCH CAP: at 17:00

## 2021-01-22 RX ADMIN — ROPINIROLE SCH MG: 1 TABLET ORAL at 08:03

## 2021-01-22 RX ADMIN — LEVOTHYROXINE SODIUM SCH MG: 0.1 TABLET ORAL at 06:49

## 2021-01-22 RX ADMIN — MEMANTINE HYDROCHLORIDE SCH MG: 10 TABLET ORAL at 21:02

## 2021-01-22 RX ADMIN — HUMAN INSULIN SCH: 100 INJECTION, SOLUTION SUBCUTANEOUS at 20:00

## 2021-01-22 RX ADMIN — HUMAN INSULIN SCH: 100 INJECTION, SOLUTION SUBCUTANEOUS at 08:00

## 2021-01-22 RX ADMIN — LORATADINE SCH MG: 10 TABLET ORAL at 17:00

## 2021-01-22 RX ADMIN — MEMANTINE HYDROCHLORIDE SCH MG: 10 TABLET ORAL at 12:24

## 2021-01-22 RX ADMIN — Medication SCH CAP: at 20:59

## 2021-01-22 RX ADMIN — Medication SCH TAB: at 08:03

## 2021-01-22 RX ADMIN — Medication SCH CAP: at 08:05

## 2021-01-22 RX ADMIN — Medication SCH PUFF: at 08:03

## 2021-01-22 RX ADMIN — Medication SCH CAP: at 08:04

## 2021-01-22 RX ADMIN — Medication SCH CAP: at 21:01

## 2021-01-22 RX ADMIN — ROPINIROLE SCH MG: 1 TABLET ORAL at 14:29

## 2021-01-22 NOTE — P.RH.PN
Estimated Length of Stay: 14


Expected Discharge Date: 01/22/21


Discharge Disposition Plan: Home


Family Support: Yes


Long Term Goal: Mobility, Transfers, Self Care


Vital Signs: 


                                Last Vital Signs











Temp  99.2 F   01/22/21 07:53


 


Pulse  76   01/22/21 07:53


 


Resp  16   01/22/21 07:53


 


BP  111/63   01/22/21 07:53


 


Pulse Ox  92   01/22/21 07:53











Laboratory: 


                             Laboratory Last Values











WBC  5.6 K/uL (4.3-10.9)   01/21/21  06:16    


 


RBC  2.72 M/uL (3.86-4.86)  L  01/21/21  06:16    


 


Hgb  8.6 g/dL (12.0-15.0)  L  01/21/21  06:16    


 


Hct  25.8 % (36.0-45.0)  L  01/21/21  06:16    


 


MCV  95.2 fL ()   01/21/21  06:16    


 


MCH  31.7 pg (27.0-35.0)   01/21/21  06:16    


 


MCHC  33.3 g/dL (32.0-36.0)   01/21/21  06:16    


 


RDW  15.0 % (12.1-15.2)   01/21/21  06:16    


 


Plt Count  229 K/uL (152-406)   01/21/21  06:16    


 


MPV  7.1 fL (7.6-11.3)  L  01/21/21  06:16    


 


Neutrophils %  65.5 % (41.7-73.7)   01/21/21  06:16    


 


Lymphocytes %  13.1 % (15.3-44.8)  L  01/21/21  06:16    


 


Monocytes %  18.4 % (3.3-12.3)  H  01/21/21  06:16    


 


Eosinophils %  2.2 % (0-4.4)   01/21/21  06:16    


 


Basophils %  0.8 % (0-1.3)   01/21/21  06:16    


 


Absolute Neutrophils  3.7 K/uL (1.8-8.0)   01/21/21  06:16    


 


Segmented Neutrophils  70 % (40-80)   01/21/21  06:16    


 


Absolute Lymphocytes  0.7 K/uL (0.7-4.9)   01/21/21  06:16    


 


Lymphocytes  12 % (15-42)  L  01/21/21  06:16    


 


Monocytes  17 % (0-10)  H  01/21/21  06:16    


 


Absolute Monocytes  1.0 K/uL (0.1-1.3)   01/21/21  06:16    


 


Eosinophils  1 % (0-3)   01/21/21  06:16    


 


Absolute Eosinophils  0.1 K/uL (0-0.5)   01/21/21  06:16    


 


Absolute Basophils  0.0 K/uL (0-0.5)   01/21/21  06:16    


 


Platelet Estimate  Adeq   01/21/21  06:16    


 


Morphology Comment  Not seen  (NOT SEEN)   01/21/21  06:16    


 


Sodium  139 mmol/L (136-145)   01/21/21  06:16    


 


Potassium  4.0 mmol/L (3.5-5.1)   01/21/21  06:16    


 


Chloride  105 mmol/L ()   01/21/21  06:16    


 


Carbon Dioxide  30 mmol/L (21-32)   01/21/21  06:16    


 


BUN  23 mg/dL (7-18)  H  01/21/21  06:16    


 


Creatinine  1.96 mg/dL (0.55-1.3)  H  01/21/21  06:16    


 


Estimated GFR  24 mL/min (=/>90)  L  01/21/21  06:16    


 


Glucose  91 mg/dL ()   01/21/21  06:16    


 


POC Glucose  106 mg/dL ()   01/22/21  07:44    


 


Calcium  9.2 mg/dL (8.5-10.1)   01/21/21  06:16    


 


Magnesium  2.6 mg/dL (1.8-2.4)  H  01/21/21  06:16    


 


Albumin  2.7 g/dL (3.4-5.0)  L  01/21/21  06:16    


 


Prealbumin  14.9 mg/dL (20-40)  L  01/21/21  06:16    


 


Urine Color  Yellow   01/20/21  18:25    


 


Urine Appearance  Cloudy   01/20/21  18:25    


 


Urine pH  6.5  (5.0-7.0)   01/20/21  18:25    


 


Ur Specific Gravity  1.010  (1.005-1.030)   01/20/21  18:25    


 


Glucose (UA)(Auto)  Negative  (NEG)   01/20/21  18:25    


 


Urine Ketones  Negative  (NEG)   01/20/21  18:25    


 


Urine Blood  1+  (NEG)  H  01/20/21  18:25    


 


Urine Nitrite  Negative  (NEG)   01/20/21  18:25    


 


Urine Bilirubin  Negative  (NEG)   01/20/21  18:25    


 


Urine Urobilinogen  1.0 mg/dL (0.2-1.0)   01/20/21  18:25    


 


Ur Leukocyte Esterase  3+  (NEG)  H  01/20/21  18:25    


 


Urine RBC  5-10 /HPF (NONE SEEN)  H  01/20/21  18:25    


 


Urine WBC  20-50 /HPF (<5)  H  01/20/21  18:25    


 


Ur Squamous Epith Cells  10-20 /HPF (NONE SEEN)  H  01/20/21  18:25    


 


Urine Bacteria  Loaded /HPF (<20)  H  01/20/21  18:25    


 


Urine Mucus  1+ /HPF (NONE SEEN)   01/20/21  18:25    


 


Urine Culture Reflexed  Reflexed   01/20/21  18:25    


 


Urine Total Protein  Trace  (NEG)   01/20/21  18:25    


 


SARS-CoV-2 RNA (RT-PCR)  Positive  (NEGATIVE)  A  01/20/21  14:00    











Weight: 145 lb 3 oz


Wound Present: Yes


Closed Surgical Incision Present: No


Negative Pressure Wound Therapy Present: No


Physician Update: She is at contact guard assistance with walking. She may 

become confused intermittently, especially in the evening. Confusion is worse if

dialysis in not on time. She got 23/30 on the MMSE with difficulty with 

attention and calculation. Difficulty with self monitoring and has vascular 

parkinsonism.


Functional Improvement: pt presents with mild strength deficits in bilateral 

LEs.  pt demonstrates poor balance and stability during ambulation and 

functional transfers.  pt exhibits poor trunk strength.  pt is limited greatly 

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  pt 

experiences poor tolerance to functional activity due to fatigue, weakness, SOB,

tone, incoordination, and diminished balance.  Skilled PT services are necessary

to address the above mentioned impairments and functional limitations.


Summary: Patient's care plan and long term goals have been reviewed and revised 

as necessary. Please see the Rehabilitation Signature page for all necessary 

signatures.

## 2021-01-23 RX ADMIN — Medication SCH CAP: at 19:07

## 2021-01-23 RX ADMIN — LEVOTHYROXINE SODIUM SCH MG: 0.1 TABLET ORAL at 06:43

## 2021-01-23 RX ADMIN — GUAIFENESIN SCH MG: 100 SOLUTION ORAL at 19:06

## 2021-01-23 RX ADMIN — MEMANTINE HYDROCHLORIDE SCH MG: 10 TABLET ORAL at 12:28

## 2021-01-23 RX ADMIN — HUMAN INSULIN SCH: 100 INJECTION, SOLUTION SUBCUTANEOUS at 19:32

## 2021-01-23 RX ADMIN — Medication SCH TAB: at 08:24

## 2021-01-23 RX ADMIN — Medication SCH: at 17:00

## 2021-01-23 RX ADMIN — MEMANTINE HYDROCHLORIDE SCH MG: 10 TABLET ORAL at 19:07

## 2021-01-23 RX ADMIN — Medication SCH PUFF: at 08:00

## 2021-01-23 RX ADMIN — Medication SCH CAP: at 08:23

## 2021-01-23 RX ADMIN — ROPINIROLE SCH MG: 1 TABLET ORAL at 08:22

## 2021-01-23 RX ADMIN — ROPINIROLE SCH MG: 1 TABLET ORAL at 19:08

## 2021-01-23 RX ADMIN — IRON SUPPLEMENT SCH MG: 325 TABLET ORAL at 08:22

## 2021-01-23 RX ADMIN — Medication SCH TAB: at 08:22

## 2021-01-23 RX ADMIN — ROPINIROLE SCH MG: 1 TABLET ORAL at 15:12

## 2021-01-23 RX ADMIN — MONTELUKAST SODIUM SCH MG: 10 TABLET, FILM COATED ORAL at 19:07

## 2021-01-23 RX ADMIN — ATORVASTATIN CALCIUM SCH MG: 20 TABLET, FILM COATED ORAL at 19:07

## 2021-01-23 RX ADMIN — LORATADINE SCH MG: 10 TABLET ORAL at 17:04

## 2021-01-23 RX ADMIN — ASPIRIN SCH MG: 81 TABLET, COATED ORAL at 12:28

## 2021-01-23 RX ADMIN — Medication SCH CAP: at 17:00

## 2021-01-23 RX ADMIN — ENOXAPARIN SODIUM SCH MG: 30 INJECTION SUBCUTANEOUS at 17:06

## 2021-01-23 RX ADMIN — HUMAN INSULIN SCH: 100 INJECTION, SOLUTION SUBCUTANEOUS at 08:00

## 2021-01-23 RX ADMIN — Medication SCH CAP: at 19:08

## 2021-01-23 RX ADMIN — Medication SCH CAP: at 08:24

## 2021-01-23 NOTE — P.CNS
Date of Consult: 01/23/21


History of Present Illness: 





Pt is an 79 y/o male with past medical hx of hypertension, esrd declared after 

non recovery from an FAN presented to [facility] for rehab post covid illness 

causing weakness. Renal has been consulted for hemodialysis needs. Pt states she

is getting stronger and notes she has been making good urine. Creatine has 

gotten dramatically better ranging between 1.7 to 19 since being inhouse. 


Allergies





Penicillins Allergy (Mild, Verified 01/21/21 06:47)


   Itching/Hives/Rash


Sulfa (Sulfonamide Antibiotics) Allergy (Mild, Verified 01/21/21 06:47)


   Itching/Hives/Rash





Home Medications: 








Alpha Lipoic Acid 200 mg PO BID 01/22/21 


Aspirin [Aspirin EC 81 MG] 81 mg PO DAILY 01/22/21 


B,C/Folic/Zinc/Selenometh/D3/E [Renaplex-D Tablet] 1 tab PO AS DIRECTED 01/22/21




Fluticasone [Flonase 50MCG Nasal Spray*] 2 spr IH DAILY 01/22/21 


Fluticasone/Salmeterol [Advair 250-50 Diskus] 1 puff IH DAILY PRN 01/22/21 


Levomefolate/B6/B12/Algal Oil [Metanx Capsule] 1 cap PO BID 01/22/21 


Levothyroxine [Synthroid*] 100 mcg PO DAILY 01/22/21 


Loratadine [Claritin*] 10 mg PO DAILY 01/22/21 


Memantine HCl 10 mg PO BID 01/22/21 


Rasagiline Mesylate 1 mg PO DAILY 01/22/21 


Ropinirole HCl [Requip*] 1 mg PO DAILY 01/22/21 


Trazodone [Desyrel*] 50 mg PO BEDTIME 01/22/21 


Umeclidinium Bromide [Incruse Ellipta] 1 inh IH DAILY 01/22/21 


Ultimate Omega 1,280 mg PO BID 01/23/21 








- Past Medical/Surgical History


Diabetic: No





- Social History


Alcohol use: No


CD- Drugs: No


Caffeine use: Yes


Place of Residence: Home





Review of Systems


10-point ROS is otherwise unremarkable





Physical Examination














Temp Pulse Resp BP Pulse Ox


 


 98.9 F   72   16   96/46 L  94 


 


 01/23/21 07:18  01/23/21 07:18  01/23/21 07:18  01/23/21 07:18  01/23/21 07:18








General: Alert, In no apparent distress


HEENT: Atraumatic, PERRLA, Mucous membr. moist/pink, EOMI, Sclerae nonicteric


Neck: Supple, 2+ carotid pulse no bruit, No LAD, Without JVD or thyroid 

abnormality


Respiratory: Clear to auscultation bilaterally, Normal air movement


Cardiovascular: Regular rate/rhythm, Normal S1 S2


Gastrointestinal: Normal bowel sounds, No tenderness


Musculoskeletal: No tenderness


Neurological: Normal gait, Normal speech, Normal tone, Normal affect


Lymphatics: No axilla or inguinal lymphadenopathy


Reviewed


Conclusions/Impression: 


Problems


FAN w.o recovery on HD


Hypertension--controlled


Debility needed Rehabilitation





Plan


Pt in renal recovery. Will monitor w/o hemodialysis and see if kidney function 

continues to improve


No signs of uremia


Monitor for renal recovery. 


Renal diet


1l fluid restriction

## 2021-01-24 RX ADMIN — MONTELUKAST SODIUM SCH MG: 10 TABLET, FILM COATED ORAL at 21:38

## 2021-01-24 RX ADMIN — Medication SCH CAP: at 09:36

## 2021-01-24 RX ADMIN — FLUTICASONE PROPIONATE SCH SPR: 50 SPRAY, METERED NASAL at 09:35

## 2021-01-24 RX ADMIN — GUAIFENESIN SCH MG: 100 SOLUTION ORAL at 21:38

## 2021-01-24 RX ADMIN — ROPINIROLE SCH MG: 1 TABLET ORAL at 13:32

## 2021-01-24 RX ADMIN — IRON SUPPLEMENT SCH MG: 325 TABLET ORAL at 09:36

## 2021-01-24 RX ADMIN — Medication SCH CAP: at 21:37

## 2021-01-24 RX ADMIN — MEMANTINE HYDROCHLORIDE SCH MG: 10 TABLET ORAL at 21:38

## 2021-01-24 RX ADMIN — LEVOTHYROXINE SODIUM SCH MG: 0.1 TABLET ORAL at 06:52

## 2021-01-24 RX ADMIN — Medication SCH CAP: at 21:36

## 2021-01-24 RX ADMIN — ROPINIROLE SCH MG: 1 TABLET ORAL at 21:38

## 2021-01-24 RX ADMIN — ENOXAPARIN SODIUM SCH MG: 30 INJECTION SUBCUTANEOUS at 16:21

## 2021-01-24 RX ADMIN — HUMAN INSULIN SCH: 100 INJECTION, SOLUTION SUBCUTANEOUS at 08:00

## 2021-01-24 RX ADMIN — Medication SCH TAB: at 09:36

## 2021-01-24 RX ADMIN — Medication SCH PUFF: at 09:38

## 2021-01-24 RX ADMIN — Medication SCH TAB: at 09:37

## 2021-01-24 RX ADMIN — Medication SCH CAP: at 17:14

## 2021-01-24 RX ADMIN — ASPIRIN SCH MG: 81 TABLET, COATED ORAL at 13:31

## 2021-01-24 RX ADMIN — ATORVASTATIN CALCIUM SCH MG: 20 TABLET, FILM COATED ORAL at 21:38

## 2021-01-24 RX ADMIN — LORATADINE SCH MG: 10 TABLET ORAL at 17:15

## 2021-01-24 RX ADMIN — Medication SCH CAP: at 09:37

## 2021-01-24 RX ADMIN — ROPINIROLE SCH MG: 1 TABLET ORAL at 09:36

## 2021-01-24 RX ADMIN — MEMANTINE HYDROCHLORIDE SCH MG: 10 TABLET ORAL at 13:32

## 2021-01-24 RX ADMIN — HUMAN INSULIN SCH: 100 INJECTION, SOLUTION SUBCUTANEOUS at 20:00

## 2021-01-25 LAB
BLD SMEAR INTERP: (no result)
BUN BLD-MCNC: 30 MG/DL (ref 7–18)
GLUCOSE SERPLBLD-MCNC: 95 MG/DL (ref 74–106)
HCT VFR BLD CALC: 24.9 % (ref 36–45)
LYMPHOCYTES # SPEC AUTO: 0.9 K/UL (ref 0.7–4.9)
MORPHOLOGY BLD-IMP: (no result)
PMV BLD: 7 FL (ref 7.6–11.3)
POTASSIUM SERPL-SCNC: 3.4 MMOL/L (ref 3.5–5.1)
RBC # BLD: 2.64 M/UL (ref 3.86–4.86)

## 2021-01-25 RX ADMIN — Medication SCH CAP: at 20:03

## 2021-01-25 RX ADMIN — Medication SCH PUFF: at 08:39

## 2021-01-25 RX ADMIN — ASPIRIN SCH MG: 81 TABLET, COATED ORAL at 11:33

## 2021-01-25 RX ADMIN — Medication SCH CAP: at 20:01

## 2021-01-25 RX ADMIN — ATORVASTATIN CALCIUM SCH MG: 20 TABLET, FILM COATED ORAL at 20:07

## 2021-01-25 RX ADMIN — LEVOTHYROXINE SODIUM SCH MG: 0.1 TABLET ORAL at 07:02

## 2021-01-25 RX ADMIN — Medication SCH TAB: at 08:40

## 2021-01-25 RX ADMIN — MEMANTINE HYDROCHLORIDE SCH MG: 10 TABLET ORAL at 20:08

## 2021-01-25 RX ADMIN — Medication SCH CAP: at 20:02

## 2021-01-25 RX ADMIN — Medication SCH CAP: at 08:40

## 2021-01-25 RX ADMIN — ROPINIROLE SCH MG: 1 TABLET ORAL at 14:02

## 2021-01-25 RX ADMIN — IRON SUPPLEMENT SCH MG: 325 TABLET ORAL at 08:40

## 2021-01-25 RX ADMIN — ROPINIROLE SCH MG: 1 TABLET ORAL at 20:08

## 2021-01-25 RX ADMIN — Medication SCH CAP: at 17:15

## 2021-01-25 RX ADMIN — ROPINIROLE SCH MG: 1 TABLET ORAL at 08:40

## 2021-01-25 RX ADMIN — Medication SCH CAP: at 08:41

## 2021-01-25 RX ADMIN — FLUTICASONE PROPIONATE SCH SPR: 50 SPRAY, METERED NASAL at 08:39

## 2021-01-25 RX ADMIN — ENOXAPARIN SODIUM SCH MG: 30 INJECTION SUBCUTANEOUS at 17:15

## 2021-01-25 RX ADMIN — HUMAN INSULIN SCH: 100 INJECTION, SOLUTION SUBCUTANEOUS at 08:00

## 2021-01-25 RX ADMIN — LORATADINE SCH MG: 10 TABLET ORAL at 17:16

## 2021-01-25 RX ADMIN — MEMANTINE HYDROCHLORIDE SCH MG: 10 TABLET ORAL at 11:33

## 2021-01-25 RX ADMIN — MONTELUKAST SODIUM SCH MG: 10 TABLET, FILM COATED ORAL at 20:07

## 2021-01-25 RX ADMIN — GUAIFENESIN SCH MG: 100 SOLUTION ORAL at 20:07

## 2021-01-25 RX ADMIN — HUMAN INSULIN SCH: 100 INJECTION, SOLUTION SUBCUTANEOUS at 20:00

## 2021-01-25 NOTE — PAPE
POST ADMISSION PHYSICIAN EVALUATION



PATIENT:



[facility] 



MR#



[id number] 



ACCT#



[id number] 



REFERRING DOCTOR



RODOLFO WISE



PRIMARY CARE PHYSICIAN



RODOLFO WISE



EVALUATION DATE AND TIME



01/25/2021 10:53 (CST) 



NAME



BEHNKE, MARY 



DATE OF BIRTH



1940 



AGE



80 



PHONE



( 



SSN#



[id number] 



GENDER



female 



EVALUATING PHYSICIAN



Dr. Jordy Lomeli M.D. 



ADMISSION DIAGNOSIS:



Post COVID, falls, debilitated and parkinsons exas.



ONSET DATE



01/04/2021



POST-ADMISSION FUNCTIONAL/MEDICAL STATUS:



- Bladder

Same accident frequency: Ind - No accidents in the past 7 days

- Bowel

Same accident frequency: Ind - No accidents in the past 7 days

- Walking

Same score based on distance walked: 0(N/A)

Same score based on distance walked: 1(<=50ft)

- Wheelchair

Same score based on distance traveled: 0(N/A)



STATUS CHANGE EVALUATION:



No change in Functional or Medical Status is identified compared with Pre-Admission screening.



PATIENT NEEDS CLOSE MEDICAL SUPERVISION BY A REHABILITATION PHYSICIAN FOR:



Coordination of Treatment Team



PATIENT REQUIRES 24X7 REHAB NURSING FOR MEDICAL AND FUNCTIONAL MGT. OF THE FOLLOWING DEFICITS:



Disease Management

Medication Management

Patient/Family Education

Providing Safe Environment



PATIENT REQUIRES INTENSIVE, COORDINATED INTERDISCIPLINARY APPROACH TO REHAB:



Arranging Home Equipment/Services

Discharge Planning

Family Intervention/Training

/Case Management



LIST OF IDENTIFIED AND POTENTIAL PROBLEMS:



Alteration in leisure activities

Bladder, Incontinence

Bowel, Incontinence

Infection, Actual or Potential

Mobility Impaired

Pain, Alteration in Comfort

Self Care Deficit

Skin Integrity, Actual or Potential

Urinary Tract Infection (UTI), Actual or Potential



PATIENT COULD BE AT RISK FOR COMPLICATIONS FROM ADVERSE MEDICAL CONDITIONS DUE TO HIS/HER COMORBIDITI
ES AND THE RIGORS OF THE INTENSIVE REHABILLITATION PROGRAM. METHODS OR INTERVENTIONS TO AVOID COMPLIC
ATIONS INCLUDE:



- Infection

Clinical staff to assess and manage the signs and symptoms of infection including fever, redness, war
mth, etc. 



- Urinary Tract Infection





- Falls

Patient will be evaluated for Fall Precautions and will be placed on Fall Precautions as indicated pe
r protocol. 



- Skin Breakdown

Nursing will assess skin daily using assessment tool and will place on Skin Breakdown Precautions as 
indicated per protocol. 



- Pain

Clinical staff may employ non-medication methods such as massage, distraction, decrease stimulus, etc
. as needed. Clinical staff will assess patient's pain level every shift per protocol to assess and e
nsure pain management effectiveness. Medications will be given and the pain level re-assessed. 



PRELIMINARY PLAN OF CARE:



- Physical Therapy

Patient needs Physical Therapy for a daily minimum of 1.5 hours at least 5 out of 7 days, to improve:
 Mobility, Strengthening, Transfers, Stretching, ROM, Endurance, Ability to manage stairs, Gait, and 
Balance. 



- Speech Therapy

Patient needs Speech Therapy for a daily minimum of 0.5 hours at least 5 out of 7 days, to improve: S
wallowing, Cognition, Language Skills, and Compensatory Strategies. 



- Rehabilitation Nursing

Patient requires 24x7 Rehabilitation Nursing for: Pain Issues, Identifying and preventing risk factor
s, Monitoring and reporting current medical conditions, Assisting with ambulation and transfer, Richy
ting with all ADL-s, Teaching patients about disease process and medications, Family teaching, Provid
ing safe environment, Bowel and Bladder Issues, Skin Integrity, and Medication Management. 





Patient needs  and/or Case Management for: Discharge Planning, Arranging Home Equipmen
t or Services, and Family Interventions. 



- Dietary and Nutrition Services

Patient needs Dietary and Nutrition Services for: Adequate Nutrition, Nutritional Supplements, and Nu
tritional Education. 



- Occupational Therapy

Patient needs Occupational Therapy for a daily minimum of 1.5 hours at least 5 out of 7 days, to impr
ove Activities of Daily Living, including: Eating, Grooming, Bathing, Dressing, Toileting, Toilet Tra
nsfers, Community Reintegration, Higher functional activities, Adaptive Equipment, Splinting, Househo
ld Tasks, and Other activities as determined. 



QI SCORES:



- Self-Care

A. Eating  03-Partial/moderate assistance  

B. Oral hygiene  03-Partial/moderate assistance  

C. Toileting hygiene  03-Partial/moderate assistance  

E. Shower/bathe self  03-Partial/moderate assistance  

F. Upper body dressing  03-Partial/moderate assistance  

G. Lower body dressing  03-Partial/moderate assistance  

H. Putting on/taking off footwear  88-Not attempted due to medical condition or safety concerns  

- Mobility

A. Roll left and right  03-Partial/moderate assistance  

B. Sit to lying  03-Partial/moderate assistance  

C. Lying to sitting on side of bed  03-Partial/moderate assistance  

D. Sit to stand  03-Partial/moderate assistance  

E. Chair/bed-to-chair transfer  03-Partial/moderate assistance  

F. Toilet transfer  03-Partial/moderate assistance  

G. Car transfer  88-Not attempted due to medical condition or safety concerns  

I. Walk 10 feet  03-Partial/moderate assistance  

J. Walk 50 feet with two turns  88-Not attempted due to medical condition or safety concerns  

K. Walk 150 feet  88-Not attempted due to medical condition or safety concerns  

L. Walking 10 feet on uneven surfaces  88-Not attempted due to medical condition or safety concerns  


M. 1 step (curb)  88-Not attempted due to medical condition or safety concerns  

N. 4 steps  88-Not attempted due to medical condition or safety concerns  

O. 12 steps  88-Not attempted due to medical condition or safety concerns  

P. Picking up object  88-Not attempted due to medical condition or safety concerns  

R. Wheel 50 feet with two turns  88-Not attempted due to medical condition or safety concerns  

S. Wheel 150 feet  88-Not attempted due to medical condition or safety concerns  

- Bladder and Bowel

Bladder continence      

Bowel continence      

- Endurance

Fair

- Balance

Fair

- Safety Awareness

Fair



POTENTIAL FUNCTIONAL GOALS FOR PATIENT TO ACHIEVE BY DISCHARGE:



- Safety Precaution

Patient will remain free from falls or injury at time of discharge. 



- Bed Mobility

Patient will perform bed mobility at 4-Valentine level of assistance. 



- Transfers

Patient will complete transfers from bed to chair at 4-Valentine level of assistance. 



- Mobility

Patient will ambulate 150 ft with 4-Valentine level of assistance with RW. 



PATIENT REHAB POTENTIAL



M. BEHNKE is able and expected to receive 3 hours of individualized therapy daily on at least 5 of ev
marlen 7 days

M. BEHNKE's prognosis for significant practical improvement within a reasonable period of time appear
s Good

Expected level of measurable improvement will be of a practical value to M. BEHNKE's functional capac
ity or adaptations to impairments

Has a viable Discharge Plan

Medically appropriate; condition is sufficiently stable to participate in intensive rehab program



DISCHARGE PLAN:



- Estimated Length of Stay (days)

14. 



- Consensus on plan

Discharge plan has been discussed with primary caregiver. Patient/Family is in agreement with the samra
n. Primary caregiver is in agreement with the plan. 



- Patient/Family Goals

Return home independently. 



- Planned Living Setting Upon Discharge

Home, to live with Family/Relatives. Transitional Living. 



CONCLUSION ON REHABILITATION NECESSITY:



I have evaluated patient's pre-admission functional status and, comparing it to the patient's post-ad
mission functional status now, I conclude that the pre-admission assessment was accurate. Patient's c
ondition on admission supports the medical necessity of admission to IRF. It is safe to proceed with 
patient's therapy program.



SIGNATURE PANEL:



Electronically signed by Dr. Jordy Lomeli M.D. on 01/25/2021 at 10:58 (CST)

## 2021-01-25 NOTE — P.PN
Subjective


Date of Service: 02/25/21


Chief Complaint: Debility, with x of esrd. 


Subjective: No C/O voiced





Review of Systems


10-point ROS is otherwise unremarkable





Physical Examination





- Vital Signs


Temperature: 98.2 F


Blood Pressure: 133/65


Pulse: 78


Respirations: 16


Pulse Ox (%): 96





- Physical Exam


General: Alert, In no apparent distress


HEENT: Atraumatic, PERRLA, EOMI


Neck: Supple, JVD not distended


Respiratory: Clear to auscultation bilaterally, Normal air movement


Cardiovascular: Regular rate/rhythm, Normal S1 S2


Gastrointestinal: Normal bowel sounds, No tenderness


Musculoskeletal: No tenderness


Integumentary: No rashes


Neurological: Normal speech, Normal tone, Normal affect


Lymphatics: No axilla or inguinal lymphadenopathy





- Studies


Laboratory Data (last 24 hrs)





01/25/21 05:38: Sodium 141, Potassium 3.4 L, BUN 30 H, Creatinine 1.86 H, 

Glucose 95


01/25/21 05:37: WBC 3.6 L D, Hgb 8.6 L, Hct 24.9 L, Plt Count 180  D








Assessment & Plan


Physician Review Additional Text: 





Problems


FAN now in renal recovery


Hypertension


Debility


S/P Corona Virus





Plan


Electrolytes and volume stable


Pt in renal recovery


Will discharge from dialysis and follow.





Will continue to follow and monitor for recovery.

## 2021-01-25 NOTE — R.PN
PROGRESS NOTES



ENCOUNTER DATE AND TIME:



01/25/2021 18:48 (CST)



NAME



BEHNKE, MARY



YOB: 1940



DATE OF ADMISSION:



01/20/2021 13:40 (CST)



Post COVID, falls, debilitated and parkinsons exas.CHIEF COMPLAINT:



Debility, post covid and parkinsonism



SUBJECTIVE:



Pt denied any Shortness of Breath.

Pt denied any depression.

WBC 3.6, Hgb 8.6, K+ 3.4, gluscose 89 to 146. UA loaded with bacteria, WBC 20 to 50, esterase 3+.

Up and down 20 steps with standby assistance. Gait training for 30 minutes continuously with standby 
assistance using a rollator.



VITAL SIGNS



SBP/DBP: 133/65

Temperature: 98.2 F

Pulse: 78

Resp: 16



MEDICATION ALLERGIES:



PCN

SULFA



ENVIRONMENTAL ALLERGIES:



- Substance Allergies

None Known

- Other Allergies

None Known



NURSING:



- Shower

allowing shower



ACTIVITIES



OOB only with supervision



THERAPIES:



- Dietary and Nutrition

Adequate Nutrition. Nutritional Education. Nutritional Supplements. 



- Speech Therapy

Memory Strategies. Speech Intelligibility Training. 



PHYSICAL EXAM



- Gen

Alert and awake

Lying in bed

No apparent distress

Oriented to: person, time, and place

- Skin

No skin breakdown.



Normacephalic

- Eyes

No abnormalities

- ENMT

No abnormalities

- Neck

No abnormalities



No cervical adenopathy

- CVS

RRR

- Chest

No abnormalities

- Resp

Clear to auscultation

- Abd

Soft

- GI

Non distended



Deferred

- 

No abnormalities

- Ext

No significant edema

- MSK

4+/5 weakness in both lower extremities.

- Neuro

No focal deficits

- Psych

No abnormalities



ASSESSMENT:



Pt. is a 79 yo Right-handed female of unknown race.On 01/20/2021 she was admitted to [facility] with diagnosis Post COVID, falls, debilitated and parkinsons exas..Her impairment category is Pulm
onary Disorders 10 -  Other Pulmonary Disorders (10.9).Pre-morbidly, Pt. was independent/mod-I in Saf
ety Awareness, Balance, Self-Care, and Endurance; and she had good Locomotion and Sphincter Control.C
urrently, she has deficits of Locomotion, Safety Awareness, Balance, Endurance, and Sphincter Control
.Pt. is now referred to [facility] for acute in-patient rehabilitation in [place]
r to maximize patient's functional independence in activities of daily living, strength, ROM, and mob
ility.- Rehab Goal

Patient has realistic goal of being discharged at assistance level 7-Ind to reside at Home with  Fami
ly/Relatives.

MDM/PLAN:



- Physical Therapy

 Gait dysfunction - to improve, our physical therapists will perform initial evaluation of pt's statu
s upon admission and devise an individualized program for Gait Training, and Wheel Chair mobility

 Need for home safety evaluation - to improve, our physical therapists will perform initial evaluatio
n of pt's status upon admission and devise an individualized program for Home Evaluation

 Need in caregiver upon discharge - to improve, our physical therapists will perform initial evaluati
on of pt's status upon admission and devise an individualized program for Caregiver Training

 New precaution - to improve, our physical therapists will perform initial evaluation of pt's status 
upon admission and devise an individualized program for Patient precaution education

Edema - to improve, our physical therapists will perform initial evaluation of pt's status upon admis
patrice and devise an individualized program for Elevation Training, and Lymphedema Therapy

 Poor balance - to improve, our physical therapists will perform initial evaluation of pt's status up
on admission and devise an individualized program for Balance Training

 Poor endurance - to improve, our physical therapists will perform initial evaluation of pt's status 
upon admission and devise an individualized program for Endurance Training

 Weakness - to improve, our physical therapists will perform initial evaluation of pt's status upon a
dmission and devise an individualized program for Aquatic Therapy, Neuromuscular Reeducation, and Str
engthening

Achieving independence - to improve, our physical therapists will perform initial evaluation of pt's 
status upon admission and devise an individualized program for Community Reintegration Activities

- Occupational Therapy

 Need for care giver - to improve, our occupation therapists will perform initial evaluation of pt's 
status upon admission and devise an individualized program for Caregiver Training

 Weakness - to improve, our occupation therapists will perform initial evaluation of pt's status upon
 admission and devise an individualized program for Aquatic Therapy, Balance, Endurance, UE ROM, and 
UE strengthening

- Other

 See attached MAR (Medication Administration Record)

- Diet Type

Continue Regular

- Diet - Liquid Texture

Continue Regular

- Tube Feed

Continue N/A

- Diet - Solid Texture

Continue Regular

- Shower

 allowing shower



FUNCTIONAL STATUS: UPDATED AT WEEKLY TEAM CONFERENCE



- Bladder

Same accident frequency: 7-Ind - No accidents in the past 7 days

- Bowel

Same accident frequency: 7-Ind - No accidents in the past 7 days

- Walking

Same score based on distance walked: 0(N/A)

Same score based on distance walked: 1(<=50ft)

- Wheelchair

Same score based on distance traveled: 0(N/A)



FUNCTIONAL STATUS:



- Self-Care

A. Eating    [place]   

B. Grooming    [place]   

C. Bathing    [place]   

D. Dressing - Upper     [place]   

E. Dressing - Lower     sup   

F. Toileting    [place]   

- Sphincter Control

G. Bladder control     [place]   

H. Bowel control     [place]   

- Transfers Control

I. Bed/Chair/Wheelchair     sup   

J. Toilet    sup   

K. Tub/Shower    Valentine   

- Locomotion

L. Walk/Wheelchair (B)     sup   

M. Stairs    modA   

- Communication

N. Comprehension (B)     Colton   

O. Expression (B)     oClton   

- Social Cognition

P. Social Interaction    Ind   

Q. Problem Solving    Ind   

R. Memory    [place]   

- Endurance

Good

- Balance

Good

- Safety Awareness

Good



QI SCORES:



- Self-Care

A. Eating  03-Partial/moderate assistance  

B. Oral hygiene  03-Partial/moderate assistance  

C. Toileting hygiene  03-Partial/moderate assistance  

E. Shower/bathe self  03-Partial/moderate assistance  

F. Upper body dressing  03-Partial/moderate assistance  

G. Lower body dressing  03-Partial/moderate assistance  

H. Putting on/taking off footwear  88-Not attempted due to medical condition or safety concerns  

- Mobility

A. Roll left and right  03-Partial/moderate assistance  

B. Sit to lying  03-Partial/moderate assistance  

C. Lying to sitting on side of bed  03-Partial/moderate assistance  

D. Sit to stand  03-Partial/moderate assistance  

E. Chair/bed-to-chair transfer  03-Partial/moderate assistance  

F. Toilet transfer  03-Partial/moderate assistance  

G. Car transfer  88-Not attempted due to medical condition or safety concerns  

I. Walk 10 feet  03-Partial/moderate assistance  

J. Walk 50 feet with two turns  88-Not attempted due to medical condition or safety concerns  

K. Walk 150 feet  88-Not attempted due to medical condition or safety concerns  

L. Walking 10 feet on uneven surfaces  88-Not attempted due to medical condition or safety concerns  


M. 1 step (curb)  88-Not attempted due to medical condition or safety concerns  

N. 4 steps  88-Not attempted due to medical condition or safety concerns  

O. 12 steps  88-Not attempted due to medical condition or safety concerns  

P. Picking up object  88-Not attempted due to medical condition or safety concerns  

R. Wheel 50 feet with two turns  88-Not attempted due to medical condition or safety concerns  

S. Wheel 150 feet  88-Not attempted due to medical condition or safety concerns  

- Bladder and Bowel

Bladder continence      

Bowel continence      

- Endurance

Fair

- Balance

Fair

- Safety Awareness

Fair



CURRENT [facility]. DEFICITS:



Self-Care, Mobility, Endurance, Balance, and Safety Awareness



SIGNATURE PANEL:



Electronically signed by Dr. Jordy Lomeli M.D. on 01/25/2021 at 18:55 (CST)

## 2021-01-26 RX ADMIN — HUMAN INSULIN SCH: 100 INJECTION, SOLUTION SUBCUTANEOUS at 20:00

## 2021-01-26 RX ADMIN — MEMANTINE HYDROCHLORIDE SCH MG: 10 TABLET ORAL at 20:56

## 2021-01-26 RX ADMIN — Medication SCH CAP: at 17:30

## 2021-01-26 RX ADMIN — MONTELUKAST SODIUM SCH MG: 10 TABLET, FILM COATED ORAL at 20:55

## 2021-01-26 RX ADMIN — HUMAN INSULIN SCH: 100 INJECTION, SOLUTION SUBCUTANEOUS at 08:00

## 2021-01-26 RX ADMIN — Medication SCH PUFF: at 08:27

## 2021-01-26 RX ADMIN — Medication SCH TAB: at 08:27

## 2021-01-26 RX ADMIN — FLUTICASONE PROPIONATE SCH SPR: 50 SPRAY, METERED NASAL at 08:27

## 2021-01-26 RX ADMIN — Medication SCH TAB: at 08:28

## 2021-01-26 RX ADMIN — ENOXAPARIN SODIUM SCH MG: 30 INJECTION SUBCUTANEOUS at 17:30

## 2021-01-26 RX ADMIN — IRON SUPPLEMENT SCH MG: 325 TABLET ORAL at 08:27

## 2021-01-26 RX ADMIN — ROPINIROLE SCH MG: 1 TABLET ORAL at 13:53

## 2021-01-26 RX ADMIN — Medication SCH CAP: at 08:28

## 2021-01-26 RX ADMIN — ROPINIROLE SCH MG: 1 TABLET ORAL at 08:27

## 2021-01-26 RX ADMIN — LEVOTHYROXINE SODIUM SCH MG: 0.1 TABLET ORAL at 06:39

## 2021-01-26 RX ADMIN — GUAIFENESIN SCH MG: 100 SOLUTION ORAL at 20:55

## 2021-01-26 RX ADMIN — Medication SCH CAP: at 20:56

## 2021-01-26 RX ADMIN — ATORVASTATIN CALCIUM SCH MG: 20 TABLET, FILM COATED ORAL at 20:55

## 2021-01-26 RX ADMIN — LORATADINE SCH MG: 10 TABLET ORAL at 17:30

## 2021-01-26 RX ADMIN — ASPIRIN SCH MG: 81 TABLET, COATED ORAL at 12:03

## 2021-01-26 RX ADMIN — MEMANTINE HYDROCHLORIDE SCH MG: 10 TABLET ORAL at 12:03

## 2021-01-26 RX ADMIN — ROPINIROLE SCH MG: 1 TABLET ORAL at 20:57

## 2021-01-26 NOTE — R.PN
PROGRESS NOTES



ENCOUNTER DATE AND TIME:



01/26/2021 17:31 (CST)



NAME



BEHNKE, MARY



YOB: 1940



DATE OF ADMISSION:



01/20/2021 13:40 (CST)



Post COVID, falls, debilitated and parkinsons exas.CHIEF COMPLAINT:



Debility, post covid and parkinsonism



SUBJECTIVE:



Pt denied any Shortness of Breath.

Pt denied any depression.

WBC 3.6, Hgb 8.6, K+ 3.4, gluscose 97 to 142. UA loaded with bacteria, WBC 20 to 50, esterase 3+. Cul
tures from 1/20/21 grew >100,000 CFU Klebsiella Pneumoniae. Given cranberry and pushed fluids.

Up and down 20 steps with standby assistance. Gait training for 45 minutes continuously with standby 
assistance for 2250' using a rollator.



VITAL SIGNS



SBP/DBP: 121/59

Temperature: 98.7 F

Pulse: 74

Resp: 16



MEDICATION ALLERGIES:



PCN

SULFA



ENVIRONMENTAL ALLERGIES:



- Substance Allergies

None Known

- Other Allergies

None Known



NURSING:



- Shower

allowing shower



ACTIVITIES



OOB only with supervision



THERAPIES:



- Dietary and Nutrition

Adequate Nutrition. Nutritional Education. Nutritional Supplements. 



- Speech Therapy

Memory Strategies. Speech Intelligibility Training. 



PHYSICAL EXAM



- Gen

Alert and awake

Lying in bed

No apparent distress

Oriented to: person, time, and place

- Skin

No skin breakdown.



Normacephalic

- Eyes

No abnormalities

- ENMT

No abnormalities

- Neck

No abnormalities



No cervical adenopathy

- CVS

RRR

- Chest

No abnormalities

- Resp

Clear to auscultation

- Abd

Soft

- GI

Non distended



Deferred

- 

No abnormalities

- Ext

No significant edema

- MSK

4+/5 weakness in both lower extremities.

- Neuro

No focal deficits

- Psych

No abnormalities



ASSESSMENT:



Pt. is a 81 yo Right-handed female of unknown race.On 01/20/2021 she was admitted to [facility] with diagnosis Post COVID, falls, debilitated and parkinsons exas..Her impairment category is Pulm
onary Disorders 10 -  Other Pulmonary Disorders (10.9).Pre-morbidly, Pt. was independent/mod-I in Saf
ety Awareness, Balance, Self-Care, and Endurance; and she had good Locomotion and Sphincter Control.C
urrently, she has deficits of Locomotion, Safety Awareness, Balance, Endurance, and Sphincter Control
.Pt. is now referred to [facility] for acute in-patient rehabilitation in [facility] to maximize patient's functional independence in activities of daily living, strength, ROM, and mob
ility.- Rehab Goal

Patient has realistic goal of being discharged at assistance level 7-Ind to reside at Home with  Fami
ly/Relatives.

MDM/PLAN:



- Physical Therapy

 Gait dysfunction - to improve, our physical therapists will perform initial evaluation of pt's statu
s upon admission and devise an individualized program for Gait Training, and Wheel Chair mobility

 Need for home safety evaluation - to improve, our physical therapists will perform initial evaluatio
n of pt's status upon admission and devise an individualized program for Home Evaluation

 Need in caregiver upon discharge - to improve, our physical therapists will perform initial evaluati
on of pt's status upon admission and devise an individualized program for Caregiver Training

 New precaution - to improve, our physical therapists will perform initial evaluation of pt's status 
upon admission and devise an individualized program for Patient precaution education

 Edema - to improve, our physical therapists will perform initial evaluation of pt's status upon admi
ssion and devise an individualized program for Elevation Training, and Lymphedema Therapy

 Poor balance - to improve, our physical therapists will perform initial evaluation of pt's status up
on admission and devise an individualized program for Balance Training

 Poor endurance - to improve, our physical therapists will perform initial evaluation of pt's status 
upon admission and devise an individualized program for Endurance Training

 Weakness - to improve, our physical therapists will perform initial evaluation of pt's status upon a
dmission and devise an individualized program for Aquatic Therapy, Neuromuscular Reeducation, and Str
engthening

 Achieving independence - to improve, our physical therapists will perform initial evaluation of pt's
 status upon admission and devise an individualized program for Community Reintegration Activities

- Occupational Therapy

 Need for care giver - to improve, our occupation therapists will perform initial evaluation of pt's 
status upon admission and devise an individualized program for Caregiver Training

 Weakness - to improve, our occupation therapists will perform initial evaluation of pt's status upon
 admission and devise an individualized program for Aquatic Therapy, Balance, Endurance, UE ROM, and 
UE strengthening

- Other

 See attached MAR (Medication Administration Record)

- Diet Type

Continue Regular

- Diet - Liquid Texture

Continue Regular

- Tube Feed

Continue N/A

- Diet - Solid Texture

Continue Regular

- Shower

 allowing shower



FUNCTIONAL STATUS: UPDATED AT WEEKLY TEAM CONFERENCE



- Bladder

Same accident frequency: 7-Ind - No accidents in the past 7 days

- Bowel

Same accident frequency: 7-Ind - No accidents in the past 7 days

- Walking

Same score based on distance walked: 0(N/A)

Same score based on distance walked: 1(<=50ft)

- Wheelchair

Same score based on distance traveled: 0(N/A)



FUNCTIONAL STATUS:



- Self-Care

A. Eating    [place]   

B. Grooming    Colton   

C. Bathing    Colton   

D. Dressing - Upper     Colton   

E. Dressing - Lower     sup   

F. Toileting    Col[place]   

- Sphincter Control

G. Bladder control     [place]   

H. Bowel control     [place]   

- Transfers Control

I. Bed/Chair/Wheelchair     sup   

J. Toilet    sup   

K. Tub/Shower    Valentine   

- Locomotion

L. Walk/Wheelchair (B)     sup   

M. Stairs    modA   

- Communication

N. Comprehension (B)     Colton   

O. Expression (B)     Colton   

- Social Cognition

P. Social Interaction    Ind   

Q. Problem Solving    Ind   

R. Memory    Col[place]   

- Endurance

Good

- Balance

Good

- Safety Awareness

Good



QI SCORES:



- Self-Care

A. Eating  03-Partial/moderate assistance  

B. Oral hygiene  03-Partial/moderate assistance  

C. Toileting hygiene  03-Partial/moderate assistance  

E. Shower/bathe self  03-Partial/moderate assistance  

F. Upper body dressing  03-Partial/moderate assistance  

G. Lower body dressing  03-Partial/moderate assistance  

H. Putting on/taking off footwear  88-Not attempted due to medical condition or safety concerns  

- Mobility

A. Roll left and right  03-Partial/moderate assistance  

B. Sit to lying  03-Partial/moderate assistance  

C. Lying to sitting on side of bed  03-Partial/moderate assistance  

D. Sit to stand  03-Partial/moderate assistance  

E. Chair/bed-to-chair transfer  03-Partial/moderate assistance  

F. Toilet transfer  03-Partial/moderate assistance  

G. Car transfer  88-Not attempted due to medical condition or safety concerns  

I. Walk 10 feet  03-Partial/moderate assistance  

J. Walk 50 feet with two turns  88-Not attempted due to medical condition or safety concerns  

K. Walk 150 feet  88-Not attempted due to medical condition or safety concerns  

L. Walking 10 feet on uneven surfaces  88-Not attempted due to medical condition or safety concerns  


M. 1 step (curb)  88-Not attempted due to medical condition or safety concerns  

N. 4 steps  88-Not attempted due to medical condition or safety concerns  

O. 12 steps  88-Not attempted due to medical condition or safety concerns  

P. Picking up object  88-Not attempted due to medical condition or safety concerns  

R. Wheel 50 feet with two turns  88-Not attempted due to medical condition or safety concerns  

S. Wheel 150 feet  88-Not attempted due to medical condition or safety concerns  

- Bladder and Bowel

Bladder continence      

Bowel continence      

- Endurance

Fair

- Balance

Fair

- Safety Awareness

Fair



CURRENT [facility]. DEFICITS:



Self-Care, Mobility, Endurance, Balance, and Safety Awareness



SIGNATURE PANEL:



Electronically signed by Dr. Jordy Lomeli M.D. on 01/26/2021 at 17:36 (CST)

## 2021-01-26 NOTE — RAD REPORT
EXAM DESCRIPTION:  [facility] Single View1/26/2021 2:32 pm

 

CLINICAL HISTORY:  Cough

 

COMPARISON:  none

 

FINDINGS:   The lungs appear clear of acute infiltrate. The heart is normal size. Pacemaker leads in 
place. Central venous catheter has its tip in the superior vena cava

 

IMPRESSION:   No acute abnormalities displayed

## 2021-01-27 RX ADMIN — MONTELUKAST SODIUM SCH MG: 10 TABLET, FILM COATED ORAL at 21:37

## 2021-01-27 RX ADMIN — ASPIRIN SCH MG: 81 TABLET, COATED ORAL at 10:16

## 2021-01-27 RX ADMIN — ATORVASTATIN CALCIUM SCH MG: 20 TABLET, FILM COATED ORAL at 21:37

## 2021-01-27 RX ADMIN — Medication SCH CAP: at 21:37

## 2021-01-27 RX ADMIN — LEVOTHYROXINE SODIUM SCH MG: 0.1 TABLET ORAL at 06:30

## 2021-01-27 RX ADMIN — FLUTICASONE PROPIONATE SCH SPR: 50 SPRAY, METERED NASAL at 10:48

## 2021-01-27 RX ADMIN — Medication SCH PUFF: at 10:49

## 2021-01-27 RX ADMIN — Medication SCH TAB: at 10:15

## 2021-01-27 RX ADMIN — Medication SCH CAP: at 16:10

## 2021-01-27 RX ADMIN — ROPINIROLE SCH MG: 1 TABLET ORAL at 10:15

## 2021-01-27 RX ADMIN — Medication SCH CAP: at 10:17

## 2021-01-27 RX ADMIN — IRON SUPPLEMENT SCH MG: 325 TABLET ORAL at 10:15

## 2021-01-27 RX ADMIN — LORATADINE SCH MG: 10 TABLET ORAL at 16:11

## 2021-01-27 RX ADMIN — HUMAN INSULIN SCH: 100 INJECTION, SOLUTION SUBCUTANEOUS at 20:00

## 2021-01-27 RX ADMIN — MEMANTINE HYDROCHLORIDE SCH MG: 10 TABLET ORAL at 21:37

## 2021-01-27 RX ADMIN — Medication SCH TAB: at 10:17

## 2021-01-27 RX ADMIN — MEMANTINE HYDROCHLORIDE SCH MG: 10 TABLET ORAL at 10:16

## 2021-01-27 RX ADMIN — HUMAN INSULIN SCH: 100 INJECTION, SOLUTION SUBCUTANEOUS at 08:00

## 2021-01-27 RX ADMIN — GUAIFENESIN SCH MG: 100 SOLUTION ORAL at 21:37

## 2021-01-27 RX ADMIN — Medication SCH CAP: at 21:38

## 2021-01-27 RX ADMIN — ROPINIROLE SCH MG: 1 TABLET ORAL at 15:11

## 2021-01-27 RX ADMIN — ROPINIROLE SCH MG: 1 TABLET ORAL at 21:48

## 2021-01-27 RX ADMIN — ENOXAPARIN SODIUM SCH MG: 30 INJECTION SUBCUTANEOUS at 16:10

## 2021-01-27 RX ADMIN — Medication SCH CAP: at 10:16

## 2021-01-28 LAB
ALBUMIN SERPL BCP-MCNC: 2.8 G/DL (ref 3.4–5)
BLD SMEAR INTERP: (no result)
BUN BLD-MCNC: 30 MG/DL (ref 7–18)
GLUCOSE SERPLBLD-MCNC: 94 MG/DL (ref 74–106)
HCT VFR BLD CALC: 24.8 % (ref 36–45)
LYMPHOCYTES # SPEC AUTO: 0.8 K/UL (ref 0.7–4.9)
MAGNESIUM SERPL-MCNC: 2.3 MG/DL (ref 1.8–2.4)
MORPHOLOGY BLD-IMP: (no result)
PMV BLD: 7 FL (ref 7.6–11.3)
POTASSIUM SERPL-SCNC: 3.4 MMOL/L (ref 3.5–5.1)
PREALB SERPL-MCNC: 18.7 MG/DL (ref 20–40)
RBC # BLD: 2.6 M/UL (ref 3.86–4.86)

## 2021-01-28 RX ADMIN — ASPIRIN SCH MG: 81 TABLET, COATED ORAL at 14:14

## 2021-01-28 RX ADMIN — IRON SUPPLEMENT SCH MG: 325 TABLET ORAL at 09:58

## 2021-01-28 RX ADMIN — Medication SCH PUFF: at 09:57

## 2021-01-28 RX ADMIN — ROPINIROLE SCH MG: 1 TABLET ORAL at 14:14

## 2021-01-28 RX ADMIN — Medication SCH CAP: at 16:33

## 2021-01-28 RX ADMIN — HUMAN INSULIN SCH: 100 INJECTION, SOLUTION SUBCUTANEOUS at 08:00

## 2021-01-28 RX ADMIN — ROPINIROLE SCH MG: 1 TABLET ORAL at 20:14

## 2021-01-28 RX ADMIN — Medication SCH TAB: at 09:57

## 2021-01-28 RX ADMIN — FLUTICASONE PROPIONATE SCH SPR: 50 SPRAY, METERED NASAL at 08:00

## 2021-01-28 RX ADMIN — MEMANTINE HYDROCHLORIDE SCH MG: 10 TABLET ORAL at 20:17

## 2021-01-28 RX ADMIN — LEVOTHYROXINE SODIUM SCH MG: 0.1 TABLET ORAL at 06:55

## 2021-01-28 RX ADMIN — LORATADINE SCH MG: 10 TABLET ORAL at 16:33

## 2021-01-28 RX ADMIN — MONTELUKAST SODIUM SCH MG: 10 TABLET, FILM COATED ORAL at 20:14

## 2021-01-28 RX ADMIN — Medication SCH CAP: at 09:58

## 2021-01-28 RX ADMIN — Medication SCH TAB: at 09:59

## 2021-01-28 RX ADMIN — Medication SCH CAP: at 20:15

## 2021-01-28 RX ADMIN — HUMAN INSULIN SCH: 100 INJECTION, SOLUTION SUBCUTANEOUS at 20:16

## 2021-01-28 RX ADMIN — ATORVASTATIN CALCIUM SCH MG: 20 TABLET, FILM COATED ORAL at 20:14

## 2021-01-28 RX ADMIN — ROPINIROLE SCH MG: 1 TABLET ORAL at 09:57

## 2021-01-28 RX ADMIN — GUAIFENESIN SCH MG: 100 SOLUTION ORAL at 20:14

## 2021-01-28 RX ADMIN — ENOXAPARIN SODIUM SCH MG: 30 INJECTION SUBCUTANEOUS at 16:32

## 2021-01-28 RX ADMIN — MEMANTINE HYDROCHLORIDE SCH MG: 10 TABLET ORAL at 14:14

## 2021-01-28 NOTE — R.PN
PROGRESS NOTES



ENCOUNTER DATE AND TIME:



01/28/2021 18:26 (CST)



NAME



BEHNKE, MARY



YOB: 1940



DATE OF ADMISSION:



01/20/2021 13:40 (CST)



Post COVID, falls, debilitated and parkinsons exas.CHIEF COMPLAINT:



Debility, post covid and parkinsonism



SUBJECTIVE:



Pt denied any Shortness of Breath.

Pt denied any depression.

WBC 3.7, Hgb 8.1, K+ 3.4, Na + 146, Crt 1.69, glucose 89 to 124, prealbumin 18.7. UA loaded with bact
eria, WBC 20 to 50, esterase 3+. Cultures from 1/20/21 grew >100,000 CFU Klebsiella Pneumoniae. Given
 cranberry and pushed fluids.

Ambulated 1250' with modified independence using a rollator.



VITAL SIGNS



SBP/DBP: 133/58

Temperature: 98.3 F

Pulse: 74

Resp: 16



MEDICATION ALLERGIES:



PCN

SULFA



ENVIRONMENTAL ALLERGIES:



- Substance Allergies

None Known

- Other Allergies

None Known



NURSING:



- Shower

allowing shower



ACTIVITIES



OOB only with supervision



THERAPIES:



- Dietary and Nutrition

Adequate Nutrition. Nutritional Education. Nutritional Supplements. 



- Speech Therapy

Memory Strategies. Speech Intelligibility Training. 



PHYSICAL EXAM



- Gen

Alert and awake

Lying in bed

No apparent distress

Oriented to: person, time, and place

- Skin

No skin breakdown.



Normacephalic

- Eyes

No abnormalities

- ENMT

No abnormalities

- Neck

No abnormalities



No cervical adenopathy

- CVS

RRR

- Chest

No abnormalities

- Resp

Clear to auscultation

- Abd

Soft

- GI

Non distended



Deferred

- 

No abnormalities

- Ext

No significant edema

- MSK

4+/5 weakness in both lower extremities.

- Neuro

No focal deficits

- Psych

No abnormalities



ASSESSMENT:



Pt. is a 81 yo Right-handed female of unknown race.On 01/20/2021 she was admitted to [facility] with diagnosis Post COVID, falls, debilitated and parkinsons exas..Her impairment category is Pulm
onary Disorders 10 -  Other Pulmonary Disorders (10.9).Pre-morbidly, Pt. was independent/mod-I in Saf
ety Awareness, Balance, Self-Care, and Endurance; and she had good Locomotion and Sphincter Control.C
urrently, she has deficits of Locomotion, Safety Awareness, Balance, Endurance, and Sphincter Control
.Pt. is now referred to [facility] for acute in-patient rehabilitation in [facility] to maximize patient's functional independence in activities of daily living, strength, ROM, and mob
ility.- Rehab Goal

Patient has realistic goal of being discharged at assistance level 7-Ind to reside at Home with  Fami
ly/Relatives.

MDM/PLAN:



- Physical Therapy

 Gait dysfunction - to improve, our physical therapists will perform initial evaluation of pt's statu
s upon admission and devise an individualized program for Gait Training, and Wheel Chair mobility

 Need for home safety evaluation - to improve, our physical therapists will perform initial evaluatio
n of pt's status upon admission and devise an individualized program for Home Evaluation

 Need in caregiver upon discharge - to improve, our physical therapists will perform initial evaluati
on of pt's status upon admission and devise an individualized program for Caregiver Training

 New precaution - to improve, our physical therapists will perform initial evaluation of pt's status 
upon admission and devise an individualized program for Patient precaution education

 Edema - to improve, our physical therapists will perform initial evaluation of pt's status upon admi
ssion and devise an individualized program for Elevation Training, and Lymphedema Therapy

 Poor balance - to improve, our physical therapists will perform initial evaluation of pt's status up
on admission and devise an individualized program for Balance Training

 Poor endurance - to improve, our physical therapists will perform initial evaluation of pt's status 
upon admission and devise an individualized program for Endurance Training

 Weakness - to improve, our physical therapists will perform initial evaluation of pt's status upon a
dmission and devise an individualized program for Aquatic Therapy, Neuromuscular Reeducation, and Str
engthening

 Achieving independence - to improve, our physical therapists will perform initial evaluation of pt's
 status upon admission and devise an individualized program for Community Reintegration Activities

- Occupational Therapy

 Need for care giver - to improve, our occupation therapists will perform initial evaluation of pt's 
status upon admission and devise an individualized program for Caregiver Training

 Weakness - to improve, our occupation therapists will perform initial evaluation of pt's status upon
 admission and devise an individualized program for Aquatic Therapy, Balance, Endurance, UE ROM, and 
UE strengthening

- Other

 See attached MAR (Medication Administration Record)

- Diet Type

Continue Regular

- Diet - Liquid Texture

Continue Regular

- Tube Feed

Continue N/A

- Diet - Solid Texture

Continue Regular

- Shower

 allowing shower



FUNCTIONAL STATUS: UPDATED AT WEEKLY TEAM CONFERENCE



- Bladder

Same accident frequency: 7-Ind - No accidents in the past 7 days

- Bowel

Same accident frequency: 7-Ind - No accidents in the past 7 days

- Walking

Same score based on distance walked: 0(N/A)

Same score based on distance walked: 1(<=50ft)

- Wheelchair

Same score based on distance traveled: 0(N/A)



FUNCTIONAL STATUS:



- Self-Care

A. Eating    [place]   

B. Grooming    Colton   

C. Bathing    [place]   

D. Dressing - Upper     [place]   

E. Dressing - Lower     sup   

F. Toileting    [place]   

- Sphincter Control

G. Bladder control     [place]   

H. Bowel control     [place]   

- Transfers Control

I. Bed/Chair/Wheelchair     sup   

J. Toilet    sup   

K. Tub/Shower    Valentine   

- Locomotion

L. Walk/Wheelchair (B)     sup   

M. Stairs    modA   

- Communication

N. Comprehension (B)     [place]   

O. Expression (B)     [place]   

- Social Cognition

P. Social Interaction    Ind   

Q. Problem Solving    Ind   

R. Memory    [place]   

- Endurance

Good

- Balance

Good

- Safety Awareness

Good



QI SCORES:



- Self-Care

A. Eating  03-Partial/moderate assistance  

B. Oral hygiene  03-Partial/moderate assistance  

C. Toileting hygiene  03-Partial/moderate assistance  

E. Shower/bathe self  03-Partial/moderate assistance  

F. Upper body dressing  03-Partial/moderate assistance  

G. Lower body dressing  03-Partial/moderate assistance  

H. Putting on/taking off footwear  88-Not attempted due to medical condition or safety concerns  

- Mobility

A. Roll left and right  03-Partial/moderate assistance  

B. Sit to lying  03-Partial/moderate assistance  

C. Lying to sitting on side of bed  03-Partial/moderate assistance  

D. Sit to stand  03-Partial/moderate assistance  

E. Chair/bed-to-chair transfer  03-Partial/moderate assistance  

F. Toilet transfer  03-Partial/moderate assistance  

G. Car transfer  88-Not attempted due to medical condition or safety concerns  

I. Walk 10 feet  03-Partial/moderate assistance  

J. Walk 50 feet with two turns  88-Not attempted due to medical condition or safety concerns  

K. Walk 150 feet  88-Not attempted due to medical condition or safety concerns  

L. Walking 10 feet on uneven surfaces  88-Not attempted due to medical condition or safety concerns  


M. 1 step (curb)  88-Not attempted due to medical condition or safety concerns  

N. 4 steps  88-Not attempted due to medical condition or safety concerns  

O. 12 steps  88-Not attempted due to medical condition or safety concerns  

P. Picking up object  88-Not attempted due to medical condition or safety concerns  

R. Wheel 50 feet with two turns  88-Not attempted due to medical condition or safety concerns  

S. Wheel 150 feet  88-Not attempted due to medical condition or safety concerns  

- Bladder and Bowel

Bladder continence      

Bowel continence      

- Endurance

Fair

- Balance

Fair

- Safety Awareness

Fair



CURRENT [facility]. DEFICITS:



Self-Care, Mobility, Endurance, Balance, and Safety Awareness



SIGNATURE PANEL:



Electronically signed by Dr. Jordy Lomeli M.D. on 01/28/2021 at 18:31 (CST)

## 2021-01-28 NOTE — R.PN
PROGRESS NOTES



ENCOUNTER DATE AND TIME:



01/27/2021 19:12 (CST)



NAME



BEHNKE, MARY



YOB: 1940



DATE OF ADMISSION:



01/20/2021 13:40 (CST)



Post COVID, falls, debilitated and parkinsons exas.CHIEF COMPLAINT:



Debility, post covid and parkinsonism



SUBJECTIVE:



Pt denied any Shortness of Breath.

Pt denied any depression.

WBC 3.6, Hgb 8.6, K+ 3.4, gluscose 97 to 132. UA loaded with bacteria, WBC 20 to 50, esterase 3+. Cul
tures from 1/20/21 grew >100,000 CFU Klebsiella Pneumoniae. Given cranberry and pushed fluids.

Ambulated 3000' with independence using a rollator.



VITAL SIGNS



SBP/DBP: 114/55

Temperature: 97.3 F

Pulse: 66

Resp: 16



MEDICATION ALLERGIES:



PCN

SULFA



ENVIRONMENTAL ALLERGIES:



- Substance Allergies

None Known

- Other Allergies

None Known



NURSING:



- Shower

allowing shower



ACTIVITIES



OOB only with supervision



THERAPIES:



- Dietary and Nutrition

Adequate Nutrition. Nutritional Education. Nutritional Supplements. 



- Speech Therapy

Memory Strategies. Speech Intelligibility Training. 



PHYSICAL EXAM



- Gen

Alert and awake

Lying in bed

No apparent distress

Oriented to: person, time, and place

- Skin

No skin breakdown.



Normacephalic

- Eyes

No abnormalities

- ENMT

No abnormalities

- Neck

No abnormalities



No cervical adenopathy

- CVS

RRR

- Chest

No abnormalities

- Resp

Clear to auscultation

- Abd

Soft

- GI

Non distended



Deferred

- 

No abnormalities

- Ext

No significant edema

- MSK

4+/5 weakness in both lower extremities.

- Neuro

No focal deficits

- Psych

No abnormalities



ASSESSMENT:



Pt. is a 81 yo Right-handed female of unknown race.On 01/20/2021 she was admitted to [facility] with diagnosis Post COVID, falls, debilitated and parkinsons exas..Her impairment category is Pulm
onary Disorders 10 -  Other Pulmonary Disorders (10.9).Pre-morbidly, Pt. was independent/mod-I in Saf
ety Awareness, Balance, Self-Care, and Endurance; and she had good Locomotion and Sphincter Control.C
urrently, she has deficits of Locomotion, Safety Awareness, Balance, Endurance, and Sphincter Control
.Pt. is now referred to [facility] for acute in-patient rehabilitation in orde
r to maximize patient's functional independence in activities of daily living, strength, ROM, and mob
ility.- Rehab Goal

Patient has realistic goal of being discharged at assistance level 7-Ind to reside at Home with  Fami
ly/Relatives.

MDM/PLAN:



- Physical Therapy

 Gait dysfunction - to improve, our physical therapists will perform initial evaluation of pt's statu
s upon admission and devise an individualized program for Gait Training, and Wheel Chair mobility

 Need for home safety evaluation - to improve, our physical therapists will perform initial evaluatio
n of pt's status upon admission and devise an individualized program for Home Evaluation

 Need in caregiver upon discharge - to improve, our physical therapists will perform initial evaluati
on of pt's status upon admission and devise an individualized program for Caregiver Training

 New precaution - to improve, our physical therapists will perform initial evaluation of pt's status 
upon admission and devise an individualized program for Patient precaution education

 Edema - to improve, our physical therapists will perform initial evaluation of pt's status upon admi
ssion and devise an individualized program for Elevation Training, and Lymphedema Therapy

 Poor balance - to improve, our physical therapists will perform initial evaluation of pt's status up
on admission and devise an individualized program for Balance Training

 Poor endurance - to improve, our physical therapists will perform initial evaluation of pt's status 
upon admission and devise an individualized program for Endurance Training

 Weakness - to improve, our physical therapists will perform initial evaluation of pt's status upon a
dmission and devise an individualized program for Aquatic Therapy, Neuromuscular Reeducation, and Str
engthening

 Achieving independence - to improve, our physical therapists will perform initial evaluation of pt's
 status upon admission and devise an individualized program for Community Reintegration Activities

- Occupational Therapy

 Need for care giver - to improve, our occupation therapists will perform initial evaluation of pt's 
status upon admission and devise an individualized program for Caregiver Training

 Weakness - to improve, our occupation therapists will perform initial evaluation of pt's status upon
 admission and devise an individualized program for Aquatic Therapy, Balance, Endurance, UE ROM, and 
UE strengthening

- Other

 See attached MAR (Medication Administration Record)

- Diet Type

Continue Regular

- Diet - Liquid Texture

Continue Regular

- Tube Feed

Continue N/A

- Diet - Solid Texture

Continue Regular

- Shower

 allowing shower



FUNCTIONAL STATUS: UPDATED AT WEEKLY TEAM CONFERENCE



- Bladder

Same accident frequency: 7-Ind - No accidents in the past 7 days

- Bowel

Same accident frequency: 7-Ind - No accidents in the past 7 days

- Walking

Same score based on distance walked: 0(N/A)

Same score based on distance walked: 1(<=50ft)

- Wheelchair

Same score based on distance traveled: 0(N/A)



FUNCTIONAL STATUS:



- Self-Care

A. Eating    [place]   

B. Grooming    Colton   

C. Bathing    [place]   

D. Dressing - Upper     [place]   

E. Dressing - Lower     sup   

F. Toileting    [place]   

- Sphincter Control

G. Bladder control     [place]   

H. Bowel control     [place]   

- Transfers Control

I. Bed/Chair/Wheelchair     sup   

J. Toilet    sup   

K. Tub/Shower    Valentine   

- Locomotion

L. Walk/Wheelchair (B)     sup   

M. Stairs    modA   

- Communication

N. Comprehension (B)     Colton   

O. Expression (B)     [place]   

- Social Cognition

P. Social Interaction    Ind   

Q. Problem Solving    Ind   

R. Memory    [place]   

- Endurance

Good

- Balance

Good

- Safety Awareness

Good



QI SCORES:



- Self-Care

A. Eating  03-Partial/moderate assistance  

B. Oral hygiene  03-Partial/moderate assistance  

C. Toileting hygiene  03-Partial/moderate assistance  

E. Shower/bathe self  03-Partial/moderate assistance  

F. Upper body dressing  03-Partial/moderate assistance  

G. Lower body dressing  03-Partial/moderate assistance  

H. Putting on/taking off footwear  88-Not attempted due to medical condition or safety concerns  

- Mobility

A. Roll left and right  03-Partial/moderate assistance  

B. Sit to lying  03-Partial/moderate assistance  

C. Lying to sitting on side of bed  03-Partial/moderate assistance  

D. Sit to stand  03-Partial/moderate assistance  

E. Chair/bed-to-chair transfer  03-Partial/moderate assistance  

F. Toilet transfer  03-Partial/moderate assistance  

G. Car transfer  88-Not attempted due to medical condition or safety concerns  

I. Walk 10 feet  03-Partial/moderate assistance  

J. Walk 50 feet with two turns  88-Not attempted due to medical condition or safety concerns  

K. Walk 150 feet  88-Not attempted due to medical condition or safety concerns  

L. Walking 10 feet on uneven surfaces  88-Not attempted due to medical condition or safety concerns  


M. 1 step (curb)  88-Not attempted due to medical condition or safety concerns  

N. 4 steps  88-Not attempted due to medical condition or safety concerns  

O. 12 steps  88-Not attempted due to medical condition or safety concerns  

P. Picking up object  88-Not attempted due to medical condition or safety concerns  

R. Wheel 50 feet with two turns  88-Not attempted due to medical condition or safety concerns  

S. Wheel 150 feet  88-Not attempted due to medical condition or safety concerns  

- Bladder and Bowel

Bladder continence      

Bowel continence      

- Endurance

Fair

- Balance

Fair

- Safety Awareness

Fair



CURRENT [facility]. DEFICITS:



Self-Care, Mobility, Endurance, Balance, and Safety Awareness



SIGNATURE PANEL:



Electronically signed by Dr. Jordy Lomeli M.D. on 01/27/2021 at 19:16 (CST)

## 2021-01-29 RX ADMIN — Medication SCH CAP: at 19:45

## 2021-01-29 RX ADMIN — IRON SUPPLEMENT SCH MG: 325 TABLET ORAL at 08:55

## 2021-01-29 RX ADMIN — MONTELUKAST SODIUM SCH MG: 10 TABLET, FILM COATED ORAL at 19:45

## 2021-01-29 RX ADMIN — ROPINIROLE SCH MG: 1 TABLET ORAL at 19:44

## 2021-01-29 RX ADMIN — ATORVASTATIN CALCIUM SCH MG: 20 TABLET, FILM COATED ORAL at 19:44

## 2021-01-29 RX ADMIN — HUMAN INSULIN SCH: 100 INJECTION, SOLUTION SUBCUTANEOUS at 20:00

## 2021-01-29 RX ADMIN — ASPIRIN SCH MG: 81 TABLET, COATED ORAL at 12:12

## 2021-01-29 RX ADMIN — Medication SCH TAB: at 08:55

## 2021-01-29 RX ADMIN — Medication SCH CAP: at 08:56

## 2021-01-29 RX ADMIN — Medication SCH PUFF: at 07:25

## 2021-01-29 RX ADMIN — Medication SCH TAB: at 08:56

## 2021-01-29 RX ADMIN — MEMANTINE HYDROCHLORIDE SCH MG: 10 TABLET ORAL at 12:12

## 2021-01-29 RX ADMIN — ENOXAPARIN SODIUM SCH MG: 30 INJECTION SUBCUTANEOUS at 17:24

## 2021-01-29 RX ADMIN — GUAIFENESIN SCH MG: 100 SOLUTION ORAL at 19:44

## 2021-01-29 RX ADMIN — MEMANTINE HYDROCHLORIDE SCH MG: 10 TABLET ORAL at 19:47

## 2021-01-29 RX ADMIN — FLUTICASONE PROPIONATE SCH SPR: 50 SPRAY, METERED NASAL at 07:25

## 2021-01-29 RX ADMIN — Medication SCH CAP: at 17:25

## 2021-01-29 RX ADMIN — HUMAN INSULIN SCH: 100 INJECTION, SOLUTION SUBCUTANEOUS at 08:00

## 2021-01-29 RX ADMIN — ROPINIROLE SCH MG: 1 TABLET ORAL at 08:55

## 2021-01-29 RX ADMIN — LORATADINE SCH MG: 10 TABLET ORAL at 17:23

## 2021-01-29 RX ADMIN — LEVOTHYROXINE SODIUM SCH MG: 0.1 TABLET ORAL at 06:30

## 2021-01-29 RX ADMIN — ROPINIROLE SCH MG: 1 TABLET ORAL at 14:01

## 2021-01-29 NOTE — P.RH.PN
Vital Signs: 


                                Last Vital Signs











Temp  98.6 F   01/29/21 09:02


 


Pulse  77   01/29/21 09:02


 


Resp  16   01/29/21 09:02


 


BP  119/60   01/29/21 09:02


 


Pulse Ox  95   01/29/21 09:02











Laboratory: 


                             Laboratory Last Values











WBC  3.70 K/uL (4.3-10.9)  L  01/28/21  06:12    


 


RBC  2.60 M/uL (3.86-4.86)  L  01/28/21  06:12    


 


Hgb  8.1 g/dL (12.0-15.0)  L  01/28/21  06:12    


 


Hct  24.8 % (36.0-45.0)  L  01/28/21  06:12    


 


MCV  95.5 fL ()   01/28/21  06:12    


 


MCH  31.1 pg (27.0-35.0)   01/28/21  06:12    


 


MCHC  32.6 g/dL (32.0-36.0)   01/28/21  06:12    


 


RDW  14.7 % (12.1-15.2)   01/28/21  06:12    


 


Plt Count  200 K/uL (152-406)   01/28/21  06:12    


 


MPV  7.0 fL (7.6-11.3)  L  01/28/21  06:12    


 


Neutrophils %  53.6 % (41.7-73.7)   01/28/21  06:12    


 


Lymphocytes %  22.0 % (15.3-44.8)   01/28/21  06:12    


 


Monocytes %  16.4 % (3.3-12.3)  H  01/28/21  06:12    


 


Eosinophils %  7.2 % (0-4.4)  H  01/28/21  06:12    


 


Basophils %  0.8 % (0-1.3)   01/28/21  06:12    


 


Absolute Neutrophils  2.0 K/uL (1.8-8.0)   01/28/21  06:12    


 


Segmented Neutrophils  70 % (40-80)   01/21/21  06:16    


 


Absolute Lymphocytes  0.8 K/uL (0.7-4.9)   01/28/21  06:12    


 


Lymphocytes  12 % (15-42)  L  01/21/21  06:16    


 


Monocytes  17 % (0-10)  H  01/21/21  06:16    


 


Absolute Monocytes  0.6 K/uL (0.1-1.3)   01/28/21  06:12    


 


Eosinophils  1 % (0-3)   01/21/21  06:16    


 


Absolute Eosinophils  0.3 K/uL (0-0.5)   01/28/21  06:12    


 


Absolute Basophils  0.0 K/uL (0-0.5)   01/28/21  06:12    


 


Platelet Estimate  Adeq   01/28/21  06:12    


 


Morphology Comment  Not seen  (NOT SEEN)   01/28/21  06:12    


 


Sodium  146 mmol/L (136-145)  H  01/28/21  06:12    


 


Potassium  3.4 mmol/L (3.5-5.1)  L  01/28/21  06:12    


 


Chloride  112 mmol/L ()  H  01/28/21  06:12    


 


Carbon Dioxide  27 mmol/L (21-32)   01/28/21  06:12    


 


BUN  30 mg/dL (7-18)  H  01/28/21  06:12    


 


Creatinine  1.69 mg/dL (0.55-1.3)  H  01/28/21  06:12    


 


Estimated GFR  29 mL/min (=/>90)  L  01/28/21  06:12    


 


Glucose  94 mg/dL ()   01/28/21  06:12    


 


POC Glucose  93 mg/dL ()   01/29/21  07:38    


 


Calcium  9.1 mg/dL (8.5-10.1)   01/28/21  06:12    


 


Magnesium  2.3 mg/dL (1.8-2.4)   01/28/21  06:12    


 


Albumin  2.8 g/dL (3.4-5.0)  L  01/28/21  06:12    


 


Prealbumin  18.7 mg/dL (20-40)  L  01/28/21  06:12    


 


Urine Color  Yellow   01/20/21  18:25    


 


Urine Appearance  Cloudy   01/20/21  18:25    


 


Urine pH  6.5  (5.0-7.0)   01/20/21  18:25    


 


Ur Specific Gravity  1.010  (1.005-1.030)   01/20/21  18:25    


 


Glucose (UA)(Auto)  Negative  (NEG)   01/20/21  18:25    


 


Urine Ketones  Negative  (NEG)   01/20/21  18:25    


 


Urine Blood  1+  (NEG)  H  01/20/21  18:25    


 


Urine Nitrite  Negative  (NEG)   01/20/21  18:25    


 


Urine Bilirubin  Negative  (NEG)   01/20/21  18:25    


 


Urine Urobilinogen  1.0 mg/dL (0.2-1.0)   01/20/21  18:25    


 


Ur Leukocyte Esterase  3+  (NEG)  H  01/20/21  18:25    


 


Urine RBC  5-10 /HPF (NONE SEEN)  H  01/20/21  18:25    


 


Urine WBC  20-50 /HPF (<5)  H  01/20/21  18:25    


 


Ur Squamous Epith Cells  10-20 /HPF (NONE SEEN)  H  01/20/21  18:25    


 


Urine Bacteria  Loaded /HPF (<20)  H  01/20/21  18:25    


 


Urine Mucus  1+ /HPF (NONE SEEN)   01/20/21  18:25    


 


Urine Culture Reflexed  Reflexed   01/20/21  18:25    


 


Urine Total Protein  Trace  (NEG)   01/20/21  18:25    


 


Hepatitis A IgM Ab  Nonreactive   01/21/21  11:33    


 


Hep Bs Antigen  Nonreactive  (Nonreactive)   01/21/21  11:33    


 


Hep Bs Antibody, Quant  <5 mIU/mL (>=10)  L  01/21/21  11:33    


 


SARS-CoV-2 RNA (RT-PCR)  Positive  (NEGATIVE)  A  01/20/21  14:00    


 


Smear Scan  Ok  (OK)   01/28/21  06:12    











Weight: 144 lb 4 oz


Wound Present: Yes


Closed Surgical Incision Present: No


Negative Pressure Wound Therapy Present: No


Physician Update: [facility] home health is set. Dialysis is set. She is doing well 

with all therapy. She has equipment for home and her family is setting up her 

home bathroom. She is walking 45 minutes without rest. She is independent. She 

has short term memory problems. Will give trazadone 50 mg tonight for better 

sleep.


Functional Improvement: pt presents with mild strength deficits in bilateral 

LEs.  pt demonstrates poor balance and stability during ambulation and 

functional transfers.  pt exhibits poor trunk strength.  pt is limited greatly 

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  pt 

experiences poor tolerance to functional activity due to fatigue, weakness, SOB,

tone, incoordination, and diminished balance.  Skilled PT services are necessary

to address the above mentioned impairments and functional limitations.


Summary: Patient's care plan and long term goals have been reviewed and revised 

as necessary. Please see the Rehabilitation Signature page for all necessary 

signatures.

## 2021-01-29 NOTE — P.RH.PN
Estimated Length of Stay: 12


Expected Discharge Date: 02/05/21


Discharge Disposition Plan: Home


Family Support: Yes


Long Term Goal: Mobility, Transfers, Self Care


Vital Signs: 


                                Last Vital Signs











Temp  98.6 F   01/29/21 09:02


 


Pulse  77   01/29/21 09:02


 


Resp  16   01/29/21 09:02


 


BP  119/60   01/29/21 09:02


 


Pulse Ox  95   01/29/21 09:02











Laboratory: 


                             Laboratory Last Values











WBC  3.70 K/uL (4.3-10.9)  L  01/28/21  06:12    


 


RBC  2.60 M/uL (3.86-4.86)  L  01/28/21  06:12    


 


Hgb  8.1 g/dL (12.0-15.0)  L  01/28/21  06:12    


 


Hct  24.8 % (36.0-45.0)  L  01/28/21  06:12    


 


MCV  95.5 fL ()   01/28/21  06:12    


 


MCH  31.1 pg (27.0-35.0)   01/28/21  06:12    


 


MCHC  32.6 g/dL (32.0-36.0)   01/28/21  06:12    


 


RDW  14.7 % (12.1-15.2)   01/28/21  06:12    


 


Plt Count  200 K/uL (152-406)   01/28/21  06:12    


 


MPV  7.0 fL (7.6-11.3)  L  01/28/21  06:12    


 


Neutrophils %  53.6 % (41.7-73.7)   01/28/21  06:12    


 


Lymphocytes %  22.0 % (15.3-44.8)   01/28/21  06:12    


 


Monocytes %  16.4 % (3.3-12.3)  H  01/28/21  06:12    


 


Eosinophils %  7.2 % (0-4.4)  H  01/28/21  06:12    


 


Basophils %  0.8 % (0-1.3)   01/28/21  06:12    


 


Absolute Neutrophils  2.0 K/uL (1.8-8.0)   01/28/21  06:12    


 


Segmented Neutrophils  70 % (40-80)   01/21/21  06:16    


 


Absolute Lymphocytes  0.8 K/uL (0.7-4.9)   01/28/21  06:12    


 


Lymphocytes  12 % (15-42)  L  01/21/21  06:16    


 


Monocytes  17 % (0-10)  H  01/21/21  06:16    


 


Absolute Monocytes  0.6 K/uL (0.1-1.3)   01/28/21  06:12    


 


Eosinophils  1 % (0-3)   01/21/21  06:16    


 


Absolute Eosinophils  0.3 K/uL (0-0.5)   01/28/21  06:12    


 


Absolute Basophils  0.0 K/uL (0-0.5)   01/28/21  06:12    


 


Platelet Estimate  Adeq   01/28/21  06:12    


 


Morphology Comment  Not seen  (NOT SEEN)   01/28/21  06:12    


 


Sodium  146 mmol/L (136-145)  H  01/28/21  06:12    


 


Potassium  3.4 mmol/L (3.5-5.1)  L  01/28/21  06:12    


 


Chloride  112 mmol/L ()  H  01/28/21  06:12    


 


Carbon Dioxide  27 mmol/L (21-32)   01/28/21  06:12    


 


BUN  30 mg/dL (7-18)  H  01/28/21  06:12    


 


Creatinine  1.69 mg/dL (0.55-1.3)  H  01/28/21  06:12    


 


Estimated GFR  29 mL/min (=/>90)  L  01/28/21  06:12    


 


Glucose  94 mg/dL ()   01/28/21  06:12    


 


POC Glucose  93 mg/dL ()   01/29/21  07:38    


 


Calcium  9.1 mg/dL (8.5-10.1)   01/28/21  06:12    


 


Magnesium  2.3 mg/dL (1.8-2.4)   01/28/21  06:12    


 


Albumin  2.8 g/dL (3.4-5.0)  L  01/28/21  06:12    


 


Prealbumin  18.7 mg/dL (20-40)  L  01/28/21  06:12    


 


Urine Color  Yellow   01/20/21  18:25    


 


Urine Appearance  Cloudy   01/20/21  18:25    


 


Urine pH  6.5  (5.0-7.0)   01/20/21  18:25    


 


Ur Specific Gravity  1.010  (1.005-1.030)   01/20/21  18:25    


 


Glucose (UA)(Auto)  Negative  (NEG)   01/20/21  18:25    


 


Urine Ketones  Negative  (NEG)   01/20/21  18:25    


 


Urine Blood  1+  (NEG)  H  01/20/21  18:25    


 


Urine Nitrite  Negative  (NEG)   01/20/21  18:25    


 


Urine Bilirubin  Negative  (NEG)   01/20/21  18:25    


 


Urine Urobilinogen  1.0 mg/dL (0.2-1.0)   01/20/21  18:25    


 


Ur Leukocyte Esterase  3+  (NEG)  H  01/20/21  18:25    


 


Urine RBC  5-10 /HPF (NONE SEEN)  H  01/20/21  18:25    


 


Urine WBC  20-50 /HPF (<5)  H  01/20/21  18:25    


 


Ur Squamous Epith Cells  10-20 /HPF (NONE SEEN)  H  01/20/21  18:25    


 


Urine Bacteria  Loaded /HPF (<20)  H  01/20/21  18:25    


 


Urine Mucus  1+ /HPF (NONE SEEN)   01/20/21  18:25    


 


Urine Culture Reflexed  Reflexed   01/20/21  18:25    


 


Urine Total Protein  Trace  (NEG)   01/20/21  18:25    


 


Hepatitis A IgM Ab  Nonreactive   01/21/21  11:33    


 


Hep Bs Antigen  Nonreactive  (Nonreactive)   01/21/21  11:33    


 


Hep Bs Antibody, Quant  <5 mIU/mL (>=10)  L  01/21/21  11:33    


 


SARS-CoV-2 RNA (RT-PCR)  Positive  (NEGATIVE)  A  01/20/21  14:00    


 


Smear Scan  Ok  (OK)   01/28/21  06:12    











Weight: 144 lb 4 oz


Wound Present: Yes


Closed Surgical Incision Present: No


Negative Pressure Wound Therapy Present: No


Physician Update: Making good progress with physical and occupational therapy. 

She has sundowners at night. Will give trazadone 50 at night.


Functional Improvement: pt presents with mild strength deficits in bilateral 

LEs.  pt demonstrates poor balance and stability during ambulation and 

functional transfers.  pt exhibits poor trunk strength.  pt is limited greatly 

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  pt 

experiences poor tolerance to functional activity due to fatigue, weakness, SOB,

tone, incoordination, and diminished balance.  Skilled PT services are necessary

to address the above mentioned impairments and functional limitations.


Summary: Patient's care plan and long term goals have been reviewed and revised 

as necessary. Please see the Rehabilitation Signature page for all necessary 

signatures. Pt received her note for work before being discharged home, however patient states that it is not written correctly.

## 2021-01-30 LAB
BUN BLD-MCNC: 27 MG/DL (ref 7–18)
GLUCOSE SERPLBLD-MCNC: 161 MG/DL (ref 74–106)
HCT VFR BLD CALC: 29 % (ref 36–45)
LYMPHOCYTES # SPEC AUTO: 0.8 K/UL (ref 0.7–4.9)
PMV BLD: 7.7 FL (ref 7.6–11.3)
POTASSIUM SERPL-SCNC: 3.6 MMOL/L (ref 3.5–5.1)
RBC # BLD: 3.04 M/UL (ref 3.86–4.86)

## 2021-01-30 RX ADMIN — ATORVASTATIN CALCIUM SCH MG: 20 TABLET, FILM COATED ORAL at 20:51

## 2021-01-30 RX ADMIN — ENOXAPARIN SODIUM SCH MG: 30 INJECTION SUBCUTANEOUS at 15:51

## 2021-01-30 RX ADMIN — HUMAN INSULIN SCH: 100 INJECTION, SOLUTION SUBCUTANEOUS at 08:00

## 2021-01-30 RX ADMIN — MEMANTINE HYDROCHLORIDE SCH MG: 10 TABLET ORAL at 20:51

## 2021-01-30 RX ADMIN — Medication SCH CAP: at 16:56

## 2021-01-30 RX ADMIN — ROPINIROLE SCH MG: 1 TABLET ORAL at 10:15

## 2021-01-30 RX ADMIN — MONTELUKAST SODIUM SCH MG: 10 TABLET, FILM COATED ORAL at 20:51

## 2021-01-30 RX ADMIN — Medication SCH PUFF: at 11:53

## 2021-01-30 RX ADMIN — IRON SUPPLEMENT SCH MG: 325 TABLET ORAL at 10:15

## 2021-01-30 RX ADMIN — Medication SCH TAB: at 10:11

## 2021-01-30 RX ADMIN — LORATADINE SCH MG: 10 TABLET ORAL at 16:56

## 2021-01-30 RX ADMIN — GUAIFENESIN SCH MG: 100 SOLUTION ORAL at 20:52

## 2021-01-30 RX ADMIN — FLUTICASONE PROPIONATE SCH SPR: 50 SPRAY, METERED NASAL at 11:53

## 2021-01-30 RX ADMIN — ASPIRIN SCH MG: 81 TABLET, COATED ORAL at 10:15

## 2021-01-30 RX ADMIN — ROPINIROLE SCH MG: 1 TABLET ORAL at 20:51

## 2021-01-30 RX ADMIN — ROPINIROLE SCH MG: 1 TABLET ORAL at 15:44

## 2021-01-30 RX ADMIN — Medication SCH TAB: at 10:15

## 2021-01-30 RX ADMIN — HUMAN INSULIN SCH: 100 INJECTION, SOLUTION SUBCUTANEOUS at 20:00

## 2021-01-30 RX ADMIN — Medication SCH CAP: at 10:11

## 2021-01-30 RX ADMIN — LEVOTHYROXINE SODIUM SCH MG: 0.1 TABLET ORAL at 06:30

## 2021-01-30 RX ADMIN — Medication SCH CAP: at 20:52

## 2021-01-30 RX ADMIN — MEMANTINE HYDROCHLORIDE SCH MG: 10 TABLET ORAL at 10:16

## 2021-01-30 NOTE — FAST
QUALITY INDICATORS FORM



SHIFT START DATE/TIME:



01/30/2021 07:00 (CST)



SHIFT END DATE/TIME:



01/30/2021 19:00 (CST)



NAME



BEHNKE, MARY



YOB: 1940



DATE OF ADMISSION:



01/20/2021 13:40 (CST)



PHONE:



(



AGE:



80



SSN#



[id number]



GENDER:



Female



ENCOUNTER PHYSICIAN:



Dr. Jordy Lomeli M.D.



ADMISSION DIAGNOSIS:



- Pulmonary Disorders 10 -  Other Pulmonary Disorders (10.9)

Post COVID, falls, debilitated and parkinsons exas. 



EATING:



EATING - STEP 1:



Does the patient complete the activity by him/herself with no assistance (physical, verbal/nonverbal 
cueing, setup/clean-up)? No.



EATING - STEP 2:



Does the patient need only setup/clean-up assistance from one helper? Yes.



1. [id number] ADMISSION PERFORMANCE:



Setup or clean-up assistance   



CODE:



05  



ORAL HYGIENE:



ORAL HYGIENE - STEP 1:



Does the patient complete the activity by him/herself with no assistance (physical, verbal/nonverbal 
cueing, setup/clean-up)? No.



ORAL HYGIENE - STEP 2:



Does the patient need only setup/clean-up assistance from one helper? Yes.



1. [id number] ADMISSION PERFORMANCE:



Setup or clean-up assistance   



CODE:



05  



TOILETING HYGIENE:



TOILETING HYGIENE - STEP 1:



Does the patient complete the activity by him/herself with no assistance (physical, verbal/nonverbal 
cueing, setup/clean-up)? Yes.



1. [id number] ADMISSION PERFORMANCE:



Independent   



CODE:



06  



BATHING:



Not assessed/no information 



CODE:



-  



DRESSING - UPPER BODY:



DRESSING - UPPER BODY - STEP 1:



Does the patient complete the activity by him/herself with no assistance (physical, verbal/nonverbal 
cueing, setup/clean-up)? No.



DRESSING - UPPER BODY - STEP 2:



Does the patient need only setup/clean-up assistance from one helper? Yes.



1. [id number] ADMISSION PERFORMANCE:



Setup or clean-up assistance   



CODE:



05  



DRESSING - LOWER BODY:



DRESSING - LOWER BODY - STEP 1:



Does the patient complete the activity by him/herself with no assistance (physical, verbal/nonverbal 
cueing, setup/clean-up)? No.



DRESSING - LOWER BODY - STEP 2:



Does the patient need only setup/clean-up assistance from one helper? Yes.



1. [id number] ADMISSION PERFORMANCE:



Setup or clean-up assistance   



CODE:



05  



PUTTING ON/TAKING OFF FOOTWEAR:



FOOTWEAR - STEP 1:



Does the patient complete the activity by him/herself with no assistance (physical, verbal/nonverbal 
cueing, setup/clean-up)? No.



FOOTWEAR - STEP 2:



Does the patient need only setup/clean-up assistance from one helper? Yes.



1. [id number] ADMISSION PERFORMANCE:



Setup or clean-up assistance   



CODE:



05  



ROLL LEFT AND RIGHT:



ROLL LEFT AND RIGHT - STEP 1:



Does the patient complete the activity by him/herself with no assistance (physical, verbal/nonverbal 
cueing, setup/clean-up)? No.



ROLL LEFT AND RIGHT - STEP 2:



Does the patient need only setup/clean-up assistance from one helper? Yes.



1. [id number] ADMISSION PERFORMANCE:



Setup or clean-up assistance   



CODE:



05  



SIT TO LYING:



SIT TO LYING - STEP 1:



Does the patient complete the activity by him/herself with no assistance (physical, verbal/nonverbal 
cueing, setup/clean-up)? Yes.



1. [id number] ADMISSION PERFORMANCE:



Independent   



CODE:



06  



LYING TO SITTING:



LYING TO SITTING ON SIDE OF BED - STEP 1:



Does the patient complete the activity by him/herself with no assistance (physical, verbal/nonverbal 
cueing, setup/clean-up)? No.



LYING TO SITTING ON SIDE OF BED - STEP 2:



Does the patient need only setup/clean-up assistance from one helper? Yes.



1. [id number] ADMISSION PERFORMANCE:



Setup or clean-up assistance   



CODE:



05  



SIT TO STAND:



SIT TO STAND - STEP 1:



Does the patient complete the activity by him/herself with no assistance (physical, verbal/nonverbal 
cueing, setup/clean-up)? Yes.



1. [id number] ADMISSION PERFORMANCE:



Independent   



CODE:



06  



TRANSFERS: BED, CHAIR:



CHAIR/BED-TO-CHAIR TRANSFER - STEP 1:



Does the patient complete the activity by him/herself with no assistance (physical, verbal/nonverbal 
cueing, setup/clean-up)? Yes.



1. [id number] ADMISSION PERFORMANCE:



Independent   



CODE:



06  



TRANSFER TOILET:



TOILET TRANSFER - STEP 1:



Does the patient complete the activity by him/herself with no assistance (physical, verbal/nonverbal 
cueing, setup/clean-up)? Yes.



1. [id number] ADMISSION PERFORMANCE:



Independent   



CODE:



06  



TRANSFERS: CAR:



Not assessed/no information 



CODE:



-  



WALK 10 FEET:



Not assessed/no information 



CODE:



-   



1 STEP (CURB):



Not assessed/no information 



CODE:



-   



PICKING UP OBJECT:



Not assessed/no information 



CODE:



-  



DOES THE PATIENT USE A WHEELCHAIR/SCOOTER?



Q1. DOES THE PATIENT USE A WHEELCHAIR/SCOOTER?:



No    



CODE:



0  



INDICATE THE TYPE OF WHEELCHAIR/SCOOTER USED:



CODE:



EXPR  



INDICATE THE TYPE OF WHEELCHAIR/SCOOTER USED:



CODE:



EXPR  



BLADDER AND BOWEL:



H350. BLADDER CONTINENCE (3-DAY ASSESSMENT PERIOD):



Always continent (no documented incontinence)   



CODE:



0  



H400. BOWEL CONTINENCE (3-DAY ASSESSMENT PERIOD):



Always continent   



CODE:



0  



SIGNATURE PANEL:



The following modified sections: 1. [id number] Admission Performance, 1. KB6281K Admission Performance, 
1. TE5412T Admission Performance, 1. KF4075q Admission Performance, 1. XQ2231o Admission Performance,
 1. FE8015u Admission Performance, 1. ID8589G Admission Performance, 1. KG6644Z Admission Performance
, 1. JX6534P Admission Performance, 1. EC0963L Admission Performance, 1. WA2563Q Admission Performanc
e, 1. GI0917Y Admission Performance, Q1. Does the patient use a wheelchair/scooter?, H350. Bladder Co
ntinence (3-day assessment period), H400. Bowel Continence (3-day assessment period) were [electronic
ally] signed by Steph Wilson C.N.A. on Sat Jan 30 2021 16:57:20 GMT-0600 (Central Standard Time)

## 2021-01-31 RX ADMIN — HUMAN INSULIN SCH: 100 INJECTION, SOLUTION SUBCUTANEOUS at 08:00

## 2021-01-31 RX ADMIN — Medication SCH TAB: at 08:47

## 2021-01-31 RX ADMIN — Medication SCH PUFF: at 08:51

## 2021-01-31 RX ADMIN — ROPINIROLE SCH MG: 1 TABLET ORAL at 08:47

## 2021-01-31 RX ADMIN — Medication SCH TAB: at 08:50

## 2021-01-31 RX ADMIN — FLUTICASONE PROPIONATE SCH SPR: 50 SPRAY, METERED NASAL at 08:51

## 2021-01-31 RX ADMIN — IRON SUPPLEMENT SCH MG: 325 TABLET ORAL at 08:47

## 2021-01-31 RX ADMIN — LEVOTHYROXINE SODIUM SCH MG: 0.1 TABLET ORAL at 06:30

## 2021-01-31 RX ADMIN — Medication SCH CAP: at 08:49

## 2021-01-31 RX ADMIN — Medication SCH CAP: at 08:48

## 2021-02-05 NOTE — R.DS
DISCHARGE SUMMARY



FACILITY



[facility]



MR#



[id number]



ACCT#



[id number]



NAME



BEHNKE, MARY



ADDRESS



[address]



[facility]



ZIP



[address]



PHONE



(



DATE OF BIRTH



1940



AGE



80



SSN#



[id number]



GENDER



Female



DEXTERITY



Right-handed



MARITAL STATUS







RACE



Unknown race



ENCOUNTER PHYSICIAN



Dr. Jordy Lomeli M.D.



REFERRING DOCTOR



RODOLFO WISE



REFERRING FACILITY



[facility]



PRIMARY CARE PHYSICIAN



RODOLFO WISE



DISCHARGE DIAGNOSIS:



- Pulmonary Disorders 10 -  Other Pulmonary Disorders (10.9)

Post COVID, falls, debilitated and parkinsons exas. 



DATE OF ADMISSION



01/20/2021 13:40 (CST)



MEDICATION ALLERGIES:



PCN

SULFA



ENVIRONMENTAL ALLERGIES:



- Substance Allergies

None Known

- Other Allergies

None Known



DISCHARGE MEDICATIONS:



Other-  ContinueSee attached MAR (Medication Administration Record).



NURSING:



- Shower

allowing shower



ACTIVITIES







OOB only with supervision



THERAPIES:



- Dietary and Nutrition

Adequate Nutrition

Nutritional Education

Nutritional Supplements

- Speech Therapy

Memory Strategies

Speech Intelligibility Training



HISTORY OF PRESENT ILLNESS:



Pt. is a 81 yo Right-handed female of unknown race.On 01/20/2021 she was admitted to [facility] with diagnosis Post COVID, falls, debilitated and parkinsons exas..Her impairment category is Pulm
onary Disorders 10 -  Other Pulmonary Disorders (10.9).Pre-morbidly, Pt. was independent/mod-I in Saf
ety Awareness, Balance, Self-Care, and Endurance; and she had good Locomotion and Sphincter Control.C
urrently, she has deficits of Locomotion, Safety Awareness, Balance, Endurance, and Sphincter Control
.Pt. is now referred to [facility] for acute in-patient rehabilitation in orde
r to maximize patient's functional independence in activities of daily living, strength, ROM, and mob
ility.- Rehab Goal

Patient has realistic goal of being discharged at assistance level 7-Ind to reside at Home with  Fami
ly/Relatives.

Mary Behnke is a 87 year- old female that lives at home

independently. She lives in a single story home with

family help when needed. She has a history Parkinsonism, chronic kidney

disease, diabetes, dis degeneration,spinal stenosis, lumbar region w out

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

COVID-19 has made her weak and not able to get out and do her normal

activivites. She has become weak and had multiple falls. She has been working with

home health therapy to michel but its not enough at this time.The

patient would most definitely benefit from acute inpatient rehab and has become

severely debilitated and unable to live at her prior level of activity at home

getting her stronger to be back living at home independently is our goal. It is

reasonable and necessary for the patient to come to acute inpatient rehab for

approximately 7-10 days in order to return to her prior level of care. She is

now being transferred to [facility] Inpatient rehabilitation and is

medically stable with relatively stable labs.

She is now medically stable but in need of 24  hour nursing, doctor

supervision and oversight while receiving active and ongoing intensive (PT, OT



therapy a day/15 hours per week and receive care with intensive

interdisciplinary approach. COVID-19 screening performed; spoke with patient

via phone. Patient denies new onset of fever, cough, difficulty breathing, sore

throat, body aches and non-allergy nasal congestion in the past 24 hours.

Patient denies travel outside of [place] in the past 14 days. Patient denies any

contact with someone who has a confirmed diagnosis of or is under investigation

for COVID-19 in the past 14 days.HOSPITAL COURSE:



DIET - LIQUID TEXTURE:

On 01/19/2021 Pt was upgraded to Regular Diet - Liquid Texture.



DIET - SOLID TEXTURE:

On 01/19/2021 Pt was upgraded to Regular Diet - Solid Texture.



DIET TYPE:

On 01/19/2021 Pt was upgraded to Regular Diet Type.



TUBE FEED:

On 01/19/2021 Pt was changed to N/A Tube Feed.



DISCHARGE PHYSICAL EXAM



- Gen

Alert and awake

Lying in bed

No apparent distress

Oriented to: person, time, and place

- Skin

No skin breakdown.



Normacephalic

- Eyes

No abnormalities

- ENMT

No abnormalities

- Neck

No abnormalities



No cervical adenopathy

- CVS

RRR

- Chest

No abnormalities

- Resp

Clear to auscultation

- Abd

Soft

- GI

Non distended



Deferred

- 

No abnormalities

- Ext

No significant edema

- MSK

4+/5 weakness in both lower extremities.

- Neuro

No focal deficits

- Psych

No abnormalities



FUNCTIONAL STATUS:



- Self-Care

A. Eating    6-Colton   

B. Grooming    6-Colton   

C. Bathing    6-Colton   

D. Dressing - Upper     6-Colton   

E. Dressing - Lower     6-Colton  

F. Toileting    6-Colton   

- Sphincter Control

G. Bladder control     6-Colton   

H. Bowel control     6-Colton   

- Transfers Control

I. Bed/Chair/Wheelchair     6-Colton  

J. Toilet    6-Colton  

K. Tub/Shower    6-Colton  

- Locomotion

L. Walk/Wheelchair (B)     6-Colton  

M. Stairs    6-Colton  

- Communication

N. Comprehension (B)     6-Colton   

O. Expression (B)     6-Colton   

- Social Cognition

P. Social Interaction    7-Ind   

Q. Problem Solving    7-Ind    

R. Memory    6-Colton   

- Endurance

Good

- Balance

Good

- Safety Awareness

Good



QI SCORES:



- Self-Care

A. Eating  03-Partial/moderate assistance  

B. Oral hygiene  03-Partial/moderate assistance  

C. Toileting hygiene  03-Partial/moderate assistance  

E. Shower/bathe self  03-Partial/moderate assistance  

F. Upper body dressing  03-Partial/moderate assistance  

G. Lower body dressing  03-Partial/moderate assistance  

H. Putting on/taking off footwear  88-Not attempted due to medical condition or safety concerns  

- Mobility

A. Roll left and right  03-Partial/moderate assistance  

B. Sit to lying  03-Partial/moderate assistance  

C. Lying to sitting on side of bed  03-Partial/moderate assistance  

D. Sit to stand  03-Partial/moderate assistance  

E. Chair/bed-to-chair transfer  03-Partial/moderate assistance  

F. Toilet transfer  03-Partial/moderate assistance  

G. Car transfer  88-Not attempted due to medical condition or safety concerns  

I. Walk 10 feet  03-Partial/moderate assistance  

J. Walk 50 feet with two turns  88-Not attempted due to medical condition or safety concerns  

K. Walk 150 feet  88-Not attempted due to medical condition or safety concerns  

L. Walking 10 feet on uneven surfaces  88-Not attempted due to medical condition or safety concerns  


M. 1 step (curb)  88-Not attempted due to medical condition or safety concerns  

N. 4 steps  88-Not attempted due to medical condition or safety concerns  

O. 12 steps  88-Not attempted due to medical condition or safety concerns  

P. Picking up object  88-Not attempted due to medical condition or safety concerns  

R. Wheel 50 feet with two turns  88-Not attempted due to medical condition or safety concerns  

S. Wheel 150 feet  88-Not attempted due to medical condition or safety concerns  

- Bladder and Bowel

Bladder continence      

Bowel continence      

- Endurance

Fair

- Balance

Fair

- Safety Awareness

Fair



DISCHARGE INSTRUCTIONS:



- N/A

Aspirin 81 mg daily and lovenox 30 mg sq daily. 



DISCHARGE PLAN, FOLLOW UP CARE PROVISIONS:



- Estimated Length of Stay (days)

14. 



- Consensus on plan

Discharge plan has been discussed with primary caregiver. Patient/Family is in agreement with the samra
n. Primary caregiver is in agreement with the plan. 



- Patient/Family Goals

Return home independently. 



- Planned Living Setting Upon Discharge

Home, to live with Family/Relatives. Transitional Living. 



SIGNATURE PANEL:



Electronically signed by Dr. Jordy Lomeli M.D. on 02/05/2021 at 16:00 (CST)

## 2021-02-25 VITALS — TEMPERATURE: 98.2 F | DIASTOLIC BLOOD PRESSURE: 65 MMHG | SYSTOLIC BLOOD PRESSURE: 133 MMHG

## 2021-10-04 ENCOUNTER — HOSPITAL ENCOUNTER (INPATIENT)
Dept: HOSPITAL 97 - 5TH | Age: 81
LOS: 10 days | Discharge: HOME | DRG: 57 | End: 2021-10-14
Attending: PSYCHIATRY & NEUROLOGY | Admitting: PSYCHIATRY & NEUROLOGY
Payer: COMMERCIAL

## 2021-10-04 VITALS — BODY MASS INDEX: 26.5 KG/M2

## 2021-10-04 DIAGNOSIS — N18.9: ICD-10-CM

## 2021-10-04 DIAGNOSIS — M48.061: ICD-10-CM

## 2021-10-04 DIAGNOSIS — Z86.16: ICD-10-CM

## 2021-10-04 DIAGNOSIS — I12.9: ICD-10-CM

## 2021-10-04 DIAGNOSIS — Z20.822: ICD-10-CM

## 2021-10-04 DIAGNOSIS — G20: Primary | ICD-10-CM

## 2021-10-04 DIAGNOSIS — R53.81: ICD-10-CM

## 2021-10-04 DIAGNOSIS — E11.22: ICD-10-CM

## 2021-10-04 PROCEDURE — 97110 THERAPEUTIC EXERCISES: CPT

## 2021-10-04 PROCEDURE — 81003 URINALYSIS AUTO W/O SCOPE: CPT

## 2021-10-04 PROCEDURE — 80048 BASIC METABOLIC PNL TOTAL CA: CPT

## 2021-10-04 PROCEDURE — 92523 SPEECH SOUND LANG COMPREHEN: CPT

## 2021-10-04 PROCEDURE — 97112 NEUROMUSCULAR REEDUCATION: CPT

## 2021-10-04 PROCEDURE — 87086 URINE CULTURE/COLONY COUNT: CPT

## 2021-10-04 PROCEDURE — 83735 ASSAY OF MAGNESIUM: CPT

## 2021-10-04 PROCEDURE — 87077 CULTURE AEROBIC IDENTIFY: CPT

## 2021-10-04 PROCEDURE — 97161 PT EVAL LOW COMPLEX 20 MIN: CPT

## 2021-10-04 PROCEDURE — 97530 THERAPEUTIC ACTIVITIES: CPT

## 2021-10-04 PROCEDURE — 36415 COLL VENOUS BLD VENIPUNCTURE: CPT

## 2021-10-04 PROCEDURE — 85025 COMPLETE CBC W/AUTO DIFF WBC: CPT

## 2021-10-04 PROCEDURE — 87088 URINE BACTERIA CULTURE: CPT

## 2021-10-04 PROCEDURE — 87186 SC STD MICRODIL/AGAR DIL: CPT

## 2021-10-04 PROCEDURE — 84134 ASSAY OF PREALBUMIN: CPT

## 2021-10-04 PROCEDURE — 82040 ASSAY OF SERUM ALBUMIN: CPT

## 2021-10-04 PROCEDURE — 81001 URINALYSIS AUTO W/SCOPE: CPT

## 2021-10-04 PROCEDURE — 97116 GAIT TRAINING THERAPY: CPT

## 2021-10-04 RX ADMIN — MEMANTINE HYDROCHLORIDE SCH MG: 10 TABLET ORAL at 19:53

## 2021-10-04 RX ADMIN — Medication SCH CAP: at 19:53

## 2021-10-04 RX ADMIN — TRAZODONE HYDROCHLORIDE SCH MG: 50 TABLET ORAL at 19:52

## 2021-10-04 RX ADMIN — APIXABAN SCH MG: 2.5 TABLET, FILM COATED ORAL at 19:52

## 2021-10-04 RX ADMIN — ATORVASTATIN CALCIUM SCH MG: 20 TABLET, FILM COATED ORAL at 19:52

## 2021-10-04 RX ADMIN — MONTELUKAST SODIUM SCH MG: 10 TABLET, FILM COATED ORAL at 19:53

## 2021-10-04 NOTE — RAD REPORT
EXAM DESCRIPTION:  RAD - Hip Left 2 View - 10/4/2021 4:10 pm

 

CLINICAL HISTORY:  R/O FRACTURE

 

COMPARISON:  No comparisons

 

FINDINGS:  No acute fracture. No malalignment. Moderate left acetabular degenerative changes.

 

IMPRESSION:  No acute osseus abnormality involving the left hip.

## 2021-10-04 NOTE — R.HP
HISTORY AND PHYSICAL



FACILITY:



[facility]



ENCOUNTER DATE AND TIME:



10/04/2021 18:25 (CDT)



MR#:



[id number]



ACCT#:



[id number]



NAME



BEHNKE, MARY



ADDRESS:



[address]



CITY:



[place]



ZIP



[id number]



PHONE:



[phone]



YOB: 1940



AGE:



81



SSN#



[id number]



GENDER:



Female



MARITAL STATUS







PRE-HOSPITAL LIVING SETTING



01 - Home (private home/apt. board/care, assisted living, group home, transitional living) 



PRE-HOSPITAL LIVING WITH



Family/Relatives 



ENCOUNTER PHYSICIAN:



Dr. Jordy Escobar M.D.



REFERRING DOCTOR:



DR. ESCOBAR



DATE OF ADMISSION:



10/04/2021 14:25 (CDT)



REFERRING FACILITY



[facility] 



HOME TYPE AND DETAILS:



Type of home: single family house

# of steps to enter the residence: 0

# of steps within the residence: 0

# of levels in the residence: 1



ONSET DATE:



01/31/2021



PRIMARY DIAGNOSIS-RELATED SURGERIES:



N/A



HISTORY OF PRESENT ILLNESS (HPI):



Pt. is a 80 yo Right-handed female.

On 01/31/2021 she was admitted to [facility] with diagnosis PARKINSON'S DZ .

Her impairment category is Neurologic Conditions 03 -  Parkinsonism (03.2).

Pre-morbidly, Pt. was independent/mod-I in Locomotion, Safety Awareness, Social Cognition, and Transf
ers Control; and she had good Balance, Transfers Control, Self-Care, Sphincter Control, Communication
, and Endurance.

Currently, she has deficits of Locomotion, Social Cognition, Safety Awareness, Balance, Transfers Con
trol, Sphincter Control, Self-Care, Endurance, and Communication.

Pt. is now referred to [facility] for acute in-patient rehabilitation in order
 to maximize patient's functional independence in activities of daily living, strength, ROM, and mobi
lity.

Patient has realistic goal of being discharged at assistance level 7-Ind to reside at Home with  Fami
ly/Relatives.





MEDICATION ALLERGIES:



No Known Drug Allergies (NKDA)



ENVIRONMENTAL ALLERGIES:



- Substance Allergies

None Known

- Other Allergies

None Known



PAST MEDICAL HISTORY:



PARKINSONS DISEASE

HIGH FALL RISK

WEAKNESS

UTI

L SPINE STENOSIS

C SPINE DISK DEGEN

DIFFICULTY IN WALKING

COVID - 19



PAST SURGICAL HISTORY:



N/A



SOCIAL HISTORY:



- Home Living

Family/Relatives



REVIEW OF SYSTEMS:



- Gen

No Chills

Fatigue

No Fever

- Eyes

No Double Vision

No itchiness

- ENMT

No Difficulty Swallowing

- CVS

No Chest Discomfort

No Chest Pain

Fatigue

No Weight Gain

- Resp

No Cough

No Shortness of Breath

- GI

Continent

No Abdominal Pain

No Constipation

No Diarrhea

- 

Continent

No Kidney Pain

No Painful Urination

No Urinary Urgency

- MSK

Joint Pain

Muscle Cramps

No Stiffness

- Skin

No Itching

No Rash

No Suspicious Lesions

- Neuro

Coordination Difficulty

No Difficulty with Concentration

No Memory Loss

No Seizures

No Weakness

- Psych

No Anxiety

No Depression



No HIV Exposure

No Persistent Infections

No Seasonal Allergies

- Endo

No Cold/Heat Intolerance

No Excessive Hunger

No Excessive Thirst

No Excessive Urination



PHYSICAL EXAM



- Gen

Alert and awake

Lying in bed

No apparent distress

Oriented to: person, time, and place

- Skin

No skin breakdown.



Normacephalic

- Eyes

No abnormalities

- ENMT

No abnormalities

- Neck

No abnormalities

- CVS

RRR

- Chest

No abnormalities

- Abd

Soft

- GI

nondistended



Deferred

- 

No abnormalities

- Ext

No significant edema

- MSK

4+/5 weakness in both lower extremities.

- Neuro

No focal deficits

- Psych

No abnormalities



VITAL SIGNS



SBP/DBP: 154/66

Pulse: 66

Resp: 16

Temperature: 98.0 F



NURSING:



- Shower

allowing shower



ACTIVITIES



OOB only with supervision



QI SCORES:



- Self-Care

A. Eating  06-Independent  

B. Oral hygiene  03-Partial/moderate assistance  

C. Toileting hygiene  03-Partial/moderate assistance  

E. Shower/bathe self  03-Partial/moderate assistance  

F. Upper body dressing  03-Partial/moderate assistance  

G. Lower body dressing  03-Partial/moderate assistance  

H. Putting on/taking off footwear  88-Not attempted due to medical condition or safety concerns  

- Mobility

A. Roll left and right  03-Partial/moderate assistance  

B. Sit to lying  03-Partial/moderate assistance  

C. Lying to sitting on side of bed  03-Partial/moderate assistance  

D. Sit to stand  03-Partial/moderate assistance  

E. Chair/bed-to-chair transfer  88-Not attempted due to medical condition or safety concerns  

F. Toilet transfer  03-Partial/moderate assistance  

G. Car transfer  03-Partial/moderate assistance  

I. Walk 10 feet  03-Partial/moderate assistance  

J. Walk 50 feet with two turns  88-Not attempted due to medical condition or safety concerns  

K. Walk 150 feet  03-Partial/moderate assistance  

L. Walking 10 feet on uneven surfaces  88-Not attempted due to medical condition or safety concerns  


M. 1 step (curb)  88-Not attempted due to medical condition or safety concerns  

N. 4 steps  88-Not attempted due to medical condition or safety concerns  

O. 12 steps  88-Not attempted due to medical condition or safety concerns  

P. Picking up object  88-Not attempted due to medical condition or safety concerns  

R. Wheel 50 feet with two turns  88-Not attempted due to medical condition or safety concerns  

S. Wheel 150 feet  88-Not attempted due to medical condition or safety concerns  

- Bladder and Bowel

Bladder continence      

Bowel continence      

- Endurance

Fair

- Balance

Fair

- Safety Awareness

Fair



CURRENT FUNC. DEFICITS:



Mobility, Endurance, Balance, Safety Awareness, and Self-Care



MEDICATIONS:



- Other

See attached MAR (Medication Administration Record)



ASSESSMENT:



Pt. is a 80 yo Right-handed female.On 01/31/2021 she was admitted to [facility] with reyna
gnosis PARKINSON'S DZ .Her impairment category is Neurologic Conditions 03 -  Parkinsonism (03.2).Pre
-morbidly, Pt. was independent/mod-I in Locomotion, Safety Awareness, Social Cognition, and Transfers
 Control; and she had good Balance, Transfers Control, Self-Care, Sphincter Control, Communication, a
nd Endurance.Currently, she has deficits of Locomotion, Social Cognition, Safety Awareness, Balance, 
Transfers Control, Sphincter Control, Self-Care, Endurance, and Communication.Pt. is now referred to 
[facility] for acute in-patient rehabilitation in order to maximize patient's 
functional independence in activities of daily living, strength, ROM, and mobility.- Rehab Goal

Patient has realistic goal of being discharged at assistance level 7-Ind to reside at Home with  Fami
ly/Relatives.





- Physical Therapy

Gait dysfunction - to improve, our physical therapists will perform initial evaluation of pt's status
 upon admission and devise an individualized program for Gait Training, and Wheel Chair mobility

Inability to transfer - to improve, our physical therapists will perform initial evaluation of pt's s
tatus upon admission and devise an individualized program for Bed mobility

Need for home safety evaluation - to improve, our physical therapists will perform initial evaluation
 of pt's status upon admission and devise an individualized program for Home Evaluation

Need in caregiver upon discharge - to improve, our physical therapists will perform initial evaluatio
n of pt's status upon admission and devise an individualized program for Caregiver Training

New precaution - to improve, our physical therapists will perform initial evaluation of pt's status u
sheldon admission and devise an individualized program for Patient precaution education

 Edema - to improve, our physical therapists will perform initial evaluation of pt's status upon admi
ssion and devise an individualized program for Elevation Training, and Lymphedema Therapy

Poor balance - to improve, our physical therapists will perform initial evaluation of pt's status upo
n admission and devise an individualized program for Balance Training

Poor endurance - to improve, our physical therapists will perform initial evaluation of pt's status u
sheldon admission and devise an individualized program for Endurance Training

Weakness - to improve, our physical therapists will perform initial evaluation of pt's status upon ad
mission and devise an individualized program for Aquatic Therapy, Neuromuscular Reeducation, and Stre
ngthening

 Achieving independence - to improve, our physical therapists will perform initial evaluation of pt's
 status upon admission and devise an individualized program for Community Reintegration Activities

- Occupational Therapy

ADL deficits - to improve, our occupation therapists will perform initial evaluation of pt's status u
sheldon admission and devise an individualized program for Bathing, Bed mobility, Community Reintegration
, Cooking, Dressing, Eating, Fine Motor Skills, Grooming, Homemaking, Kitchen Mobility, Laundry, Laurita
ent Education, Safety Awareness, Splinting - Positioning, Transfers(Toilet, Tub, Shower), and Wheel C
hair Management

Cognitive deficits - to improve, our occupation therapists will perform initial evaluation of pt's st
atus upon admission and devise an individualized program for Cognition - orientation

Need for care giver - to improve, our occupation therapists will perform initial evaluation of pt's s
tatus upon admission and devise an individualized program for Caregiver Training

Weakness - to improve, our occupation therapists will perform initial evaluation of pt's status upon 
admission and devise an individualized program for Aquatic Therapy, Balance, Endurance, UE ROM, and U
E strengthening



MEDICAL PLAN:



- Diet Type

Start Regular

- Diet - Liquid Texture

Start Regular

- Tube Feed

Start N/A

- Other

See attached MAR (Medication Administration Record)

- N/A

Perform Neuro consult

- Diet - Solid Texture

Regular

- Shower

 shower



DISCHARGE PLAN:



- Estimated Length of Stay (days)

13. 



- Consensus on plan

Discharge plan has been discussed with primary caregiver. Patient/Family is in agreement with the samra
n. Primary caregiver is in agreement with the plan. 



- Patient/Family Goals

Return home independently. 



- Planned Living Setting Upon Discharge

Home, to live with Family/Relatives. Transitional Living. 



SIGNATURE PANEL:



Electronically signed by Dr. Jordy Escobar M.D. on 10/04/2021 at 18:28 (CDT)

## 2021-10-04 NOTE — RAD REPORT
EXAM DESCRIPTION:  RAD - Knee Left 2 View - 10/4/2021 4:10 pm

 

CLINICAL HISTORY:  R/O FRACTURE

 

COMPARISON:  No comparisons

 

FINDINGS:  No acute fracture. No malalignment. Tricompartmental degenerative changes with mild to mod
erate narrowing in the lateral compartment and mild narrowing in the patellofemoral and medial compar
tments.

 

IMPRESSION:  No acute osseous abnormality involving the left knee.

## 2021-10-04 NOTE — R.PREADM
PRE-ADMISSION SCREENING FORM



SCREENING DATE AND TIME



10/01/2021 15:27 (CDT) 



ANTICIPATED REHAB ADMISSION DATE



10/03/2021 



REFERRING FACILITY



[facility] 



REFERRAL DATE AND TIME



2021 15:27 (CDT) 





Previous Rehabilitation(s):





No.



REFERRING PHYSICIAN



DR. LOMELI 



PREVIOUS CONSULTING PHYSICIANS



RENA NOE MD



REHAB FACILITY



[facility] 



CLINICAL LIAISON



Noemy Ram 



PHYSICIAN REVIEWER



Dr. Jordy Lomeli M.D. 



MR#



[id number] 



[id number] 



NAME



BEHNKE, MARY GANT



ADDRESS



[address] 



PHONE



[phone] 



[facility]



[id number] 



DATE OF BIRTH



1940 



AGE



81 



SSN#



[id number] 



GENDER



female 



MARITAL STATUS



 



PREF. LANGUAGE (IF NON-ENGLISH)



English 



ADMIT FROM



01 - Home (private home/apt. board/care, assisted living, group home, transitional living) 



PRE-HOSPITAL LIVING SETTING



01 - Home (private home/apt. board/care, assisted living, group home, transitional living) 



HOME TYPE AND DETAILS



Type of home: single family house

# of steps to enter the residence: 0

# of steps within the residence: 0

# of levels in the residence: 1



PRE-HOSPITAL LIVING WITH



Family/Relatives 



FAMILY SUPPORT



Yes 



PRIMARY FAMILY CONTACT NAME



CHANEL BANEGAS 



PRIMARY FAMILY CONTACT PHONE



[phone] 



PRIMARY FAMILY CONTACT RELATIONSHIP



Daughter 



PHONE PRIMARY FAMILY CONTACT ON ADM.?



no 



IS PRIMARY FAMILY CONTACT AUTH. REP.?



no 



1ST EMERGENCY CONTACT



CHANEL BANEGAS 



1ST CONTACT PHONE



[phone] 



1ST CONTACT RELATIONSHIP



Daughter 



PHONE 1ST CONTACT ON ADM.



no 



IS 1ST CONTACT AUTH. REP.?



no 



PHONE 2ND CONTACT ON ADM.?



no 



PATIENT EMPLOYMENT STATUS



Retired (for age) 



PATIENT EMPLOYER



No Employer 



PAYOR INFORMATION:



1ST PAYOR NAME



[employer] 



1ST PAYOR PHONE



[phone] 



1ST PAYOR POLICY ID



[id number] 



INJURY/ILLNESS DUE TO ACCIDENT?



No 



ANOTHER PARTY RESPONSIBLE?



No 



PRIMARY REHAB/ACUTE DIAGNOSIS:



PARKINSON'S DZ 



ONSET DATE



2021



REHAB IMPAIRMENT CATEGORY (JENNIFER):



06 Neurological (Neuro) MEETS 60% rule



PRIMARY DIAGNOSIS-RELATED SURGERIES:



N/A



RISK FOR COMPLICATIONS:



- N/A

WEAKNESS

L SPINE STENOSIS

C SPINE DISK DEGEN

HIGH FALL RISK



SUMMARY OF ACUTE HOSPITALIZATION:



Pt. is a 80 yo Right-handed female.

On 2021 she was admitted to [facility] with diagnosis PARKINSON'S DZ .

Her impairment category is Neurologic Conditions 03 -  Parkinsonism (03.2).

Pre-morbidly, Pt. was independent/mod-I in Locomotion, Safety Awareness, Social Cognition, and Transf
ers Control; and she had good Balance, Transfers Control, Self-Care, Sphincter Control, Communication
, and Endurance.

Currently, she has deficits of Locomotion, Social Cognition, Safety Awareness, Balance, Transfers Con
trol, Sphincter Control, Self-Care, Endurance, and Communication.

Pt. is now referred to [facility] for acute in-patient rehabilitation in order
 to maximize patient's functional independence in activities of daily living, strength, ROM, and mobi
lity.

Patient has realistic goal of being discharged at assistance level 7-Ind to reside at Home with  Fami
ly/Relatives.





CONSULT:



Neuro consult



PAST MEDICAL HISTORY



PARKINSONS DISEASE

HIGH FALL RISK

WEAKNESS

UTI

L SPINE STENOSIS

C SPINE DISK DEGEN

DIFFICULTY IN WALKING

COVID - 19



PAST SURGICAL HISTORY:



N/A



MEDICATION ALLERGIES:



No Known Drug Allergies (NKDA)



ENVIRONMENTAL ALLERGIES:



- Substance Allergies

None Known

- Other Allergies

None Known



CODE STATUS:



Full code



BMI



N/A 



DIET:



- Diet Type

Regular

- Diet - Solid Texture

Regular

- Diet - Liquid Texture

Regular

- Tube Feed

N/A



REVIEW OF SYSTEMS:



- Gen

Alert and awake

Lying in bed

No apparent distress

Oriented to: person, time, and place

- Vital Signs

SBP/DBP: 114/60

Pulse: 60

Resp: 18

Vital signs stable, afebrile

- CVS

RRR



VITAL SIGNS



SBP/DBP: 114/60

Pulse: 60

Resp: 18

Vital signs stable, afebrile



MEDICATIONS/TREATMENT:



Other-  See attached MAR (Medication Administration Record).



CURRENT SPHINCTER CONTROL:



Pre-hospital bladder status: unspecified

# of bladder accidents in the last 7 days prior to screenin

Pre-hospital bowel status: unspecified

# of bowel accidents in the last 7 days prior to screenin

Last Bowel Movement Date: 10/01/2021



CURRENT LOCOMOTION STATUS:



distance walked 100 feet W QUAD CANE



DETAILED CURRENT FUNCTIONAL STATUS:



- Bladder

accident frequency: 7-Ind - No accidents in the past 7 days

- Bowel

accident frequency: 7-Ind - No accidents in the past 7 days

- Walking

score based on distance walked: 0(N/A)

- Wheelchair

score based on distance traveled: 0(N/A)



QI SCORES:



- Self-Care

A. Eating  06-Independent  

B. Oral hygiene  03-Partial/moderate assistance  

C. Toileting hygiene  03-Partial/moderate assistance  

E. Shower/bathe self  03-Partial/moderate assistance  

F. Upper body dressing  03-Partial/moderate assistance  

G. Lower body dressing  03-Partial/moderate assistance  

H. Putting on/taking off footwear  88-Not attempted due to medical condition or safety concerns  

- Mobility

A. Roll left and right  03-Partial/moderate assistance  

B. Sit to lying  03-Partial/moderate assistance  

C. Lying to sitting on side of bed  03-Partial/moderate assistance  

D. Sit to stand  03-Partial/moderate assistance  

E. Chair/bed-to-chair transfer  88-Not attempted due to medical condition or safety concerns  

F. Toilet transfer  03-Partial/moderate assistance  

G. Car transfer  03-Partial/moderate assistance  

I. Walk 10 feet  03-Partial/moderate assistance  

J. Walk 50 feet with two turns  88-Not attempted due to medical condition or safety concerns  

K. Walk 150 feet  03-Partial/moderate assistance  

L. Walking 10 feet on uneven surfaces  88-Not attempted due to medical condition or safety concerns  


M. 1 step (curb)  88-Not attempted due to medical condition or safety concerns  

N. 4 steps  88-Not attempted due to medical condition or safety concerns  

O. 12 steps  88-Not attempted due to medical condition or safety concerns  

P. Picking up object  88-Not attempted due to medical condition or safety concerns  

R. Wheel 50 feet with two turns  88-Not attempted due to medical condition or safety concerns  

S. Wheel 150 feet  88-Not attempted due to medical condition or safety concerns  

- Bladder and Bowel

Bladder continence      

Bowel continence      

- Endurance

Fair

- Balance

Fair

- Safety Awareness

Fair



CURRENT FUNC. DEFICITS:



Mobility, Endurance, Balance, Safety Awareness, and Self-Care



CURRENT / PREVIOUS ASSISTIVE DEVICES:



Quad Cane

Rolling Walker





HISTORY OF FALLS. HAS THE PATIENT HAD TWO OR MORE FALLS IN THE PAST YEAR OR ANY FALL WITH INJURY IN T
HE PAST YEAR?:



Yes 



PRIOR SURGERY. DID THE PATIENT HAVE MAJOR SURGERY DURING  DAYS PRIOR TO ADMISSION?:



No 



THERAPY NOTES FROM ACUTE CARE:



Attached.



SPECIAL NEEDS:



- Safety Concerns

Skin breakdown precautions needed due to skin breakdown risk



PATIENT NEEDS ACTIVE AND ONGOING THERAPEUTIC INTERVENTION OF MULTIPLE THERAPY DISCIPLINES, INCLUDING:




- Dietary and Nutrition

Adequate Nutrition. Nutritional Education. Nutritional Supplements. 



- Occupational Therapy

Cognitive Retraining. Visual Perceptual Training. 



- Speech Therapy

Cognitive Training. Expressive Language Skills. Memory Strategies. Receptive Language Skills. Speech 
Intelligibility Training. 



- Physical Therapy

Evaluate and Treat. Gait Training. Balance Training. Transfer Training. LE Strengthening. Patient/Fam
anabel Education. 



PATIENT NEEDS CLOSE MEDICAL SUPERVISION BY A REHABILITATION PHYSICIAN FOR:



Coordination of Treatment Team



PATIENT REQUIRES 24X7 REHAB NURSING FOR MEDICAL AND FUNCTIONAL MGT. OF THE FOLLOWING DEFICITS:



Disease Management

Medication Management

Patient/Family Education

Providing Safe Environment



PATIENT REQUIRES INTENSIVE, COORDINATED INTERDISCIPLINARY APPROACH TO REHAB:



Arranging Home Equipment/Services

Discharge Planning

Family Intervention/Training

/Case Management



PATIENT REHAB POTENTIAL:



M. BEHNKE is able and expected to receive 3 hours of individualized therapy daily on at least 5 of ev
marlen 7 days

M. BEHNKE's prognosis for significant practical improvement within a reasonable period of time appear
s Good

Expected level of measurable improvement will be of a practical value to M. BEHNKE's functional capac
ity or adaptations to impairments

Has a viable Discharge Plan

Medically appropriate; condition is sufficiently stable to participate in intensive rehab program



DISCHARGE PLAN:



- Estimated Length of Stay (days)

13. 



- Consensus on plan

Discharge plan has been discussed with primary caregiver. Patient/Family is in agreement with the samra
n. Primary caregiver is in agreement with the plan. 



- Patient/Family Goals

Return home independently. 



- Planned Living Setting Upon Discharge

Home, to live with Family/Relatives. Transitional Living. 



RECOMMENDED CARE LEVEL:



IRF



RECOMMENDATION DETAILS:



Recommended Admission to Comprehensive Rehabilitation Program to Increase Functional [place]



SCREENER'S COMPLETENESS CONFIRMATION:



- Screening Confirmation

The patient data collection on this preadmission screening form is finished



PHYSICIANS REVIEW AND ADMISSION DETERMINATION



Admit - Based on my review of the Pre-Admission Screening results, in my medical judgment and experie
nce, I concur with the findings and recommend admission to [facility], as this
 patient requires an IRF level of care.



SIGNATURE PANEL:



 - [electronically] signed by Noemy Ram on 10/04/2021 at 10:37 (CDT)

 - [electronically] signed by Ryan Pearl PT on 10/04/2021 at 12:17 (CDT)

Physician Reviewer - [electronically] signed by Dr. Jordy Lomeli M.D. on 10/04/2021 at 12:19 (CDT
)

## 2021-10-04 NOTE — PAPE
POST ADMISSION PHYSICIAN EVALUATION



PATIENT:



[facility] 



MR#



[id number] 



ACCT#



[id number] 



REFERRING DOCTOR



DR. ESCOBAR



EVALUATION DATE AND TIME



10/04/2021 18:28 (CDT) 



NAME



BEHNKE, MARY GANT



DATE OF BIRTH



1940 



AGE



81 



PHONE



[phone] 



SSN#



[id number] 



GENDER



female 



EVALUATING PHYSICIAN



Dr. Jordy Escobar M.D. 



ADMISSION DIAGNOSIS:



PARKINSON'S DZ 



ONSET DATE



01/31/2021



POST-ADMISSION FUNCTIONAL/MEDICAL STATUS:



- Bladder

Same accident frequency: 7-Ind - No accidents in the past 7 days

- Bowel

Same accident frequency: 7-Ind - No accidents in the past 7 days

- Walking

Same score based on distance walked: 0(N/A)

- Wheelchair

Same score based on distance traveled: 0(N/A)



STATUS CHANGE EVALUATION:



No change in Functional or Medical Status is identified compared with Pre-Admission screening.



PATIENT NEEDS CLOSE MEDICAL SUPERVISION BY A REHABILITATION PHYSICIAN FOR:



Coordination of Treatment Team



PATIENT REQUIRES 24X7 REHAB NURSING FOR MEDICAL AND FUNCTIONAL MGT. OF THE FOLLOWING DEFICITS:



Disease Management

Medication Management

Patient/Family Education

Providing Safe Environment



PATIENT REQUIRES INTENSIVE, COORDINATED INTERDISCIPLINARY APPROACH TO REHAB:



Arranging Home Equipment/Services

Discharge Planning

Family Intervention/Training

/Case Management



LIST OF IDENTIFIED AND POTENTIAL PROBLEMS:



Alteration in leisure activities

Bladder, Incontinence

Bowel, Incontinence

Infection, Actual or Potential

Mobility Impaired

Pain, Alteration in Comfort

Self Care Deficit

Skin Integrity, Actual or Potential

Urinary Tract Infection (UTI), Actual or Potential



RISK FOR COMPLICATIONS



- N/A

WEAKNESS. L SPINE STENOSIS. C SPINE DISK DEGEN. HIGH FALL RISK. 



PATIENT COULD BE AT RISK FOR COMPLICATIONS FROM ADVERSE MEDICAL CONDITIONS DUE TO HIS/HER COMORBIDITI
ES AND THE RIGORS OF THE INTENSIVE REHABILLITATION PROGRAM. METHODS OR INTERVENTIONS TO AVOID COMPLIC
ATIONS INCLUDE:



- Infection

Clinical staff to assess and manage the signs and symptoms of infection including fever, redness, war
mth, etc. 



- Urinary Tract Infection





- Falls

Patient will be evaluated for Fall Precautions and will be placed on Fall Precautions as indicated pe
r protocol. 



- Skin Breakdown

Nursing will assess skin daily using assessment tool and will place on Skin Breakdown Precautions as 
indicated per protocol. 



- Pain

Clinical staff may employ non-medication methods such as massage, distraction, decrease stimulus, etc
. as needed. Clinical staff will assess patient's pain level every shift per protocol to assess and e
nsure pain management effectiveness. Medications will be given and the pain level re-assessed. 



PRELIMINARY PLAN OF CARE:



- Physical Therapy

Patient needs Physical Therapy for a daily minimum of 1.5 hours at least 5 out of 7 days, to improve:
 Mobility, Strengthening, Transfers, Stretching, ROM, Endurance, Ability to manage stairs, Gait, and 
Balance. 



- Speech Therapy

Patient needs Speech Therapy for a daily minimum of 0.5 hours at least 5 out of 7 days, to improve: S
wallowing, Cognition, Language Skills, and Compensatory Strategies. 



- Rehabilitation Nursing

Patient requires 24x7 Rehabilitation Nursing for: Pain Issues, Identifying and preventing risk factor
s, Monitoring and reporting current medical conditions, Assisting with ambulation and transfer, Richy
ting with all ADL-s, Teaching patients about disease process and medications, Family teaching, Provid
ing safe environment, Bowel and Bladder Issues, Skin Integrity, and Medication Management. 





Patient needs  and/or Case Management for: Discharge Planning, Arranging Home Equipmen
t or Services, and Family Interventions. 



- Dietary and Nutrition Services

Patient needs Dietary and Nutrition Services for: Adequate Nutrition, Nutritional Supplements, and Nu
tritional Education. 



- Occupational Therapy

Patient needs Occupational Therapy for a daily minimum of 1.5 hours at least 5 out of 7 days, to impr
ove Activities of Daily Living, including: Eating, Grooming, Bathing, Dressing, Toileting, Toilet Tra
nsfers, Community Reintegration, Higher functional activities, Adaptive Equipment, Splinting, Househo
ld Tasks, and Other activities as determined. 



QI SCORES:



- Self-Care

A. Eating  06-Independent  

B. Oral hygiene  03-Partial/moderate assistance  

C. Toileting hygiene  03-Partial/moderate assistance  

E. Shower/bathe self  03-Partial/moderate assistance  

F. Upper body dressing  03-Partial/moderate assistance  

G. Lower body dressing  03-Partial/moderate assistance  

H. Putting on/taking off footwear  88-Not attempted due to medical condition or safety concerns  

- Mobility

A. Roll left and right  03-Partial/moderate assistance  

B. Sit to lying  03-Partial/moderate assistance  

C. Lying to sitting on side of bed  03-Partial/moderate assistance  

D. Sit to stand  03-Partial/moderate assistance  

E. Chair/bed-to-chair transfer  88-Not attempted due to medical condition or safety concerns  

F. Toilet transfer  03-Partial/moderate assistance  

G. Car transfer  03-Partial/moderate assistance  

I. Walk 10 feet  03-Partial/moderate assistance  

J. Walk 50 feet with two turns  88-Not attempted due to medical condition or safety concerns  

K. Walk 150 feet  03-Partial/moderate assistance  

L. Walking 10 feet on uneven surfaces  88-Not attempted due to medical condition or safety concerns  


M. 1 step (curb)  88-Not attempted due to medical condition or safety concerns  

N. 4 steps  88-Not attempted due to medical condition or safety concerns  

O. 12 steps  88-Not attempted due to medical condition or safety concerns  

P. Picking up object  88-Not attempted due to medical condition or safety concerns  

R. Wheel 50 feet with two turns  88-Not attempted due to medical condition or safety concerns  

S. Wheel 150 feet  88-Not attempted due to medical condition or safety concerns  

- Bladder and Bowel

Bladder continence      

Bowel continence      

- Endurance

Fair

- Balance

Fair

- Safety Awareness

Fair



POTENTIAL FUNCTIONAL GOALS FOR PATIENT TO ACHIEVE BY DISCHARGE:



- Safety Precaution

Patient will remain free from falls or injury at time of discharge. 



- Bed Mobility

Patient will perform bed mobility at 4-Valentine level of assistance. 



- Transfers

Patient will complete transfers from bed to chair at 4-Valentine level of assistance. 



- Mobility

Patient will ambulate 150 ft with 4-Valentine level of assistance with RW. 



PATIENT REHAB POTENTIAL



M. BEHNKE is able and expected to receive 3 hours of individualized therapy daily on at least 5 of ev
marlen 7 days

M. BEHNKE's prognosis for significant practical improvement within a reasonable period of time appear
s Good

Expected level of measurable improvement will be of a practical value to M. BEHNKE's functional capac
ity or adaptations to impairments

Has a viable Discharge Plan

Medically appropriate; condition is sufficiently stable to participate in intensive rehab program



DISCHARGE PLAN:



- Estimated Length of Stay (days)

13. 



- Consensus on plan

Discharge plan has been discussed with primary caregiver. Patient/Family is in agreement with the samra
n. Primary caregiver is in agreement with the plan. 



- Patient/Family Goals

Return home independently. 



- Planned Living Setting Upon Discharge

Home, to live with Family/Relatives. Transitional Living. 



CONCLUSION ON REHABILITATION NECESSITY:



I have evaluated patient's pre-admission functional status and, comparing it to the patient's post-ad
mission functional status now, I conclude that the pre-admission assessment was accurate. Patient's c
ondition on admission supports the medical necessity of admission to IRF. It is safe to proceed with 
patient's therapy program.



SIGNATURE PANEL:



Electronically signed by Dr. Jordy Escobar M.D. on 10/04/2021 at 18:29 (CDT)

## 2021-10-05 LAB
ALBUMIN SERPL BCP-MCNC: 3.3 G/DL (ref 3.4–5)
BUN BLD-MCNC: 25 MG/DL (ref 7–18)
GLUCOSE SERPLBLD-MCNC: 104 MG/DL (ref 74–106)
HCT VFR BLD CALC: 31 % (ref 36–45)
LYMPHOCYTES # SPEC AUTO: 1 K/UL (ref 0.7–4.9)
MAGNESIUM SERPL-MCNC: 2.4 MG/DL (ref 1.8–2.4)
MORPHOLOGY BLD-IMP: (no result)
PMV BLD: 6.8 FL (ref 7.6–11.3)
POTASSIUM SERPL-SCNC: 4.3 MMOL/L (ref 3.5–5.1)
PREALB SERPL-MCNC: 23.8 MG/DL (ref 20–40)
RBC # BLD: 3.33 M/UL (ref 3.86–4.86)

## 2021-10-05 RX ADMIN — DONEPEZIL HYDROCHLORIDE SCH MG: 5 TABLET ORAL at 07:59

## 2021-10-05 RX ADMIN — CHOLECALCIFEROL TAB 25 MCG (1000 UNIT) SCH UNIT: 25 TAB at 07:59

## 2021-10-05 RX ADMIN — MEMANTINE HYDROCHLORIDE SCH MG: 10 TABLET ORAL at 20:20

## 2021-10-05 RX ADMIN — TRAZODONE HYDROCHLORIDE SCH MG: 50 TABLET ORAL at 20:19

## 2021-10-05 RX ADMIN — APIXABAN SCH MG: 2.5 TABLET, FILM COATED ORAL at 07:59

## 2021-10-05 RX ADMIN — ATORVASTATIN CALCIUM SCH MG: 20 TABLET, FILM COATED ORAL at 20:20

## 2021-10-05 RX ADMIN — ASPIRIN SCH MG: 81 TABLET, COATED ORAL at 07:59

## 2021-10-05 RX ADMIN — LEVOTHYROXINE SODIUM SCH MG: 0.1 TABLET ORAL at 06:54

## 2021-10-05 RX ADMIN — Medication SCH CAP: at 07:58

## 2021-10-05 RX ADMIN — MEMANTINE HYDROCHLORIDE SCH MG: 10 TABLET ORAL at 07:59

## 2021-10-05 RX ADMIN — APIXABAN SCH MG: 2.5 TABLET, FILM COATED ORAL at 20:19

## 2021-10-05 RX ADMIN — LORATADINE SCH MG: 10 TABLET ORAL at 08:00

## 2021-10-05 RX ADMIN — MONTELUKAST SODIUM SCH MG: 10 TABLET, FILM COATED ORAL at 20:20

## 2021-10-05 RX ADMIN — Medication SCH MG: at 20:19

## 2021-10-05 RX ADMIN — Medication SCH CAP: at 20:20

## 2021-10-05 NOTE — R.PN
PROGRESS NOTES



ENCOUNTER DATE AND TIME:



10/05/2021 17:45 (CDT)



NAME



BEHNKE, MARY



YOB: 1940



DATE OF ADMISSION:



10/04/2021 14:25 (CDT)



PARKINSON'S DZ Hypertensive chronic kidney disease I12.9, type 2 diabetes E11.22, Lumbar spinal steno
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



Parkinsonism, debility



SUBJECTIVE:



Pt denied any depression.

Pt denied any Shortness of Breath.

She reports doing well with all therapy. She denies significant pain or other problems. She has no ne
w complaints.

WBC 4.0, Hgb 10.5, Plt 211, Crt 1.69, prealbumin 23.8. UA shows esterase 2+, bacteria > 50.

Ambulated 600' with standby assistance using a rolling walker. No fractures of left knee and hip x-ra
ys.



VITAL SIGNS



SBP/DBP: 147/60

Pulse: 60

Resp: 16

Temperature: 97.8 F



MEDICATION ALLERGIES:



No Known Drug Allergies (NKDA)



ENVIRONMENTAL ALLERGIES:



- Substance Allergies

None Known

- Other Allergies

None Known



CONSULT:



Perform Neuro consult

Perform Primary care follow up to assess routine medical needs



NURSING:



- Shower

allowing shower

- Bladder

care per protocol

- Skin

care per protocol



ACTIVITIES



OOB only with supervision



THERAPIES:



- Dietary and Nutrition

Adequate Nutrition. Nutritional Education. Nutritional Supplements. 



- Occupational Therapy

Cognitive Retraining. Visual Perceptual Training. 



- Speech Therapy

Cognitive Training. Expressive Language Skills. Memory Strategies. Receptive Language Skills. Speech 
Intelligibility Training. 



- Physical Therapy

Evaluate and Treat. Gait Training. Balance Training. Transfer Training. LE Strengthening. Patient/Fam
anabel Education. 



PHYSICAL EXAM



- Gen

Alert and awake

Lying in bed

No apparent distress

Oriented to: person, time, and place

- Skin

No skin breakdown.



Normacephalic

- Eyes

No abnormalities

- ENMT

No abnormalities

- Neck

No abnormalities

- CVS

RRR

- Chest

No abnormalities

- Abd

Soft

- GI

nondistended



Deferred

- 

No abnormalities

- Ext

No significant edema

- MSK

4+/5 weakness in both lower extremities.

- Neuro

No focal deficits

- Psych

No abnormalities

- OTHER

She reports nontraumatic left hip and knee pain. Aggravated by lying on the left side.

Two view x-rays of the left hip and knee reveal no abnormalities.



ASSESSMENT:



Pt. is a 82 yo Right-handed female.On 01/31/2021 she was admitted to [facility] with reyna martins PARKINSON'S DZ .Her impairment category is Neurologic Conditions 03 -  Parkinsonism (03.2).Pre
-morbidly, Pt. was independent/mod-I in Locomotion, Safety Awareness, Social Cognition, and Transfers
 Control; and she had good Balance, Transfers Control, Self-Care, Sphincter Control, Communication, a
nd Endurance.Currently, she has deficits of Locomotion, Social Cognition, Safety Awareness, Balance, 
Transfers Control, Sphincter Control, Self-Care, Endurance, and Communication.Pt. is now referred to 
[facility] for acute in-patient rehabilitation in order to maximize patient's 
functional independence in activities of daily living, strength, ROM, and mobility.- Rehab Goal

Patient has realistic goal of being discharged at assistance level 7-Ind to reside at Home with  Fami
ly/Relatives.

MDM/PLAN:



- Physical Therapy

Gait dysfunction - to improve, our physical therapists will perform initial evaluation of pt's status
 upon admission and devise an individualized program for Gait Training, and Wheel Chair mobility

 Inability to transfer - to improve, our physical therapists will perform initial evaluation of pt's 
status upon admission and devise an individualized program for Bed mobility

 Need for home safety evaluation - to improve, our physical therapists will perform initial evaluatio
n of pt's status upon admission and devise an individualized program for Home Evaluation

Need in caregiver upon discharge - to improve, our physical therapists will perform initial evaluatio
n of pt's status upon admission and devise an individualized program for Caregiver Training

New precaution - to improve, our physical therapists will perform initial evaluation of pt's status u
sheldon admission and devise an individualized program for Patient precaution education

Edema - to improve, our physical therapists will perform initial evaluation of pt's status upon admis
patrice and devise an individualized program for Elevation Training, and Lymphedema Therapy

 Poor balance - to improve, our physical therapists will perform initial evaluation of pt's status up
on admission and devise an individualized program for Balance Training

 Poor endurance - to improve, our physical therapists will perform initial evaluation of pt's status 
upon admission and devise an individualized program for Endurance Training

Weakness - to improve, our physical therapists will perform initial evaluation of pt's status upon ad
mission and devise an individualized program for Aquatic Therapy, Neuromuscular Reeducation, and Stre
ngthening

Achieving independence - to improve, our physical therapists will perform initial evaluation of pt's 
status upon admission and devise an individualized program for Community Reintegration Activities

- Occupational Therapy

 ADL deficits - to improve, our occupation therapists will perform initial evaluation of pt's status 
upon admission and devise an individualized program for Bathing, Bed mobility, Community Reintegratio
n, Cooking, Dressing, Eating, Fine Motor Skills, Grooming, Homemaking, Kitchen Mobility, Laundry, Pat
ient Education, Safety Awareness, Splinting - Positioning, Transfers(Toilet, Tub, Shower), and Wheel 
Chair Management

 Cognitive deficits - to improve, our occupation therapists will perform initial evaluation of pt's s
tatus upon admission and devise an individualized program for Cognition - orientation

Need for care giver - to improve, our occupation therapists will perform initial evaluation of pt's s
tatus upon admission and devise an individualized program for Caregiver Training

Weakness - to improve, our occupation therapists will perform initial evaluation of pt's status upon 
admission and devise an individualized program for Aquatic Therapy, Balance, Endurance, UE ROM, and U
E strengthening

- Other

 See attached MAR (Medication Administration Record)

- Diet Type

Regular

- Diet - Liquid Texture

Regular

- Tube Feed

N/A

- N/A

Perform Neuro consult

Perform Primary care follow up to assess routine medical needs

- Diet - Solid Texture

Regular

- Shower

 allowing shower

- Bladder

care per protocol

- Skin

care per protocol



FUNCTIONAL STATUS: UPDATED AT WEEKLY TEAM CONFERENCE



- Bladder

Same accident frequency: 7-Ind - No accidents in the past 7 days

- Bowel

Same accident frequency: 7-Ind - No accidents in the past 7 days

- Walking

Same score based on distance walked: 0(N/A)

- Wheelchair

Same score based on distance traveled: 0(N/A)



FUNCTIONAL STATUS:



- Self-Care

A. Eating    Ind   

B. Grooming    [place]   

C. Bathing    sup   

D. Dressing - Upper     sup   

E. Dressing - Lower     sup   

F. Toileting    sup   

- Sphincter Control

G. Bladder control     [place]   

H. Bowel control     [place]   

- Transfers Control

I. Bed/Chair/Wheelchair     Valentine   

J. Toilet    Valentine   

K. Tub/Shower    Valentine   

- Locomotion

L. Walk/Wheelchair (B)     sup   

M. Stairs    [place]entine   

- Communication

N. Comprehension (B)     sup   

O. Expression (B)     sup   

- Social Cognition

P. Social Interaction    Ind   

Q. Problem Solving    sup   

R. Memory    sup   

- Endurance

Good

- Balance

Fair

- Safety Awareness

Fair



QI SCORES:



- Self-Care

A. Eating  06-Independent  

B. Oral hygiene  03-Partial/moderate assistance  

C. Toileting hygiene  03-Partial/moderate assistance  

E. Shower/bathe self  03-Partial/moderate assistance  

F. Upper body dressing  03-Partial/moderate assistance  

G. Lower body dressing  03-Partial/moderate assistance  

H. Putting on/taking off footwear  88-Not attempted due to medical condition or safety concerns  

- Mobility

A. Roll left and right  03-Partial/moderate assistance  

B. Sit to lying  03-Partial/moderate assistance  

C. Lying to sitting on side of bed  03-Partial/moderate assistance  

D. Sit to stand  03-Partial/moderate assistance  

E. Chair/bed-to-chair transfer  88-Not attempted due to medical condition or safety concerns  

F. Toilet transfer  03-Partial/moderate assistance  

G. Car transfer  03-Partial/moderate assistance  

I. Walk 10 feet  03-Partial/moderate assistance  

J. Walk 50 feet with two turns  88-Not attempted due to medical condition or safety concerns  

K. Walk 150 feet  03-Partial/moderate assistance  

L. Walking 10 feet on uneven surfaces  88-Not attempted due to medical condition or safety concerns  


M. 1 step (curb)  88-Not attempted due to medical condition or safety concerns  

N. 4 steps  88-Not attempted due to medical condition or safety concerns  

O. 12 steps  88-Not attempted due to medical condition or safety concerns  

P. Picking up object  88-Not attempted due to medical condition or safety concerns  

R. Wheel 50 feet with two turns  88-Not attempted due to medical condition or safety concerns  

S. Wheel 150 feet  88-Not attempted due to medical condition or safety concerns  

- Bladder and Bowel

Bladder continence      

Bowel continence      

- Endurance

Fair

- Balance

Fair

- Safety Awareness

Fair



[facility]



Mobility, Endurance, Balance, Safety Awareness, and Self-Care



SIGNATURE PANEL:



Electronically signed by Dr. Jordy Lomeli M.D. on 10/05/2021 at 17:55 (CDT)

## 2021-10-06 RX ADMIN — SULFAMETHOXAZOLE AND TRIMETHOPRIM SCH TAB: 800; 160 TABLET ORAL at 11:48

## 2021-10-06 RX ADMIN — MEMANTINE HYDROCHLORIDE SCH MG: 10 TABLET ORAL at 08:06

## 2021-10-06 RX ADMIN — Medication SCH CAP: at 08:05

## 2021-10-06 RX ADMIN — ATORVASTATIN CALCIUM SCH MG: 20 TABLET, FILM COATED ORAL at 19:55

## 2021-10-06 RX ADMIN — Medication SCH MG: at 08:05

## 2021-10-06 RX ADMIN — APIXABAN SCH MG: 2.5 TABLET, FILM COATED ORAL at 08:06

## 2021-10-06 RX ADMIN — LORATADINE SCH MG: 10 TABLET ORAL at 08:00

## 2021-10-06 RX ADMIN — MEMANTINE HYDROCHLORIDE SCH MG: 10 TABLET ORAL at 19:57

## 2021-10-06 RX ADMIN — Medication SCH CAP: at 08:04

## 2021-10-06 RX ADMIN — DONEPEZIL HYDROCHLORIDE SCH MG: 5 TABLET ORAL at 08:06

## 2021-10-06 RX ADMIN — SULFAMETHOXAZOLE AND TRIMETHOPRIM SCH TAB: 800; 160 TABLET ORAL at 19:57

## 2021-10-06 RX ADMIN — Medication SCH MG: at 19:55

## 2021-10-06 RX ADMIN — ASPIRIN SCH MG: 81 TABLET, COATED ORAL at 08:05

## 2021-10-06 RX ADMIN — LEVOTHYROXINE SODIUM SCH MG: 0.1 TABLET ORAL at 06:36

## 2021-10-06 RX ADMIN — APIXABAN SCH MG: 2.5 TABLET, FILM COATED ORAL at 19:56

## 2021-10-06 RX ADMIN — TRAZODONE HYDROCHLORIDE SCH MG: 50 TABLET ORAL at 19:56

## 2021-10-06 RX ADMIN — MONTELUKAST SODIUM SCH MG: 10 TABLET, FILM COATED ORAL at 19:56

## 2021-10-06 RX ADMIN — Medication SCH CAP: at 19:57

## 2021-10-06 RX ADMIN — CHOLECALCIFEROL TAB 25 MCG (1000 UNIT) SCH UNIT: 25 TAB at 08:05

## 2021-10-07 LAB
ALBUMIN SERPL BCP-MCNC: 3.3 G/DL (ref 3.4–5)
BUN BLD-MCNC: 30 MG/DL (ref 7–18)
GLUCOSE SERPLBLD-MCNC: 107 MG/DL (ref 74–106)
HCT VFR BLD CALC: 30.8 % (ref 36–45)
LYMPHOCYTES # SPEC AUTO: 0.7 K/UL (ref 0.7–4.9)
MAGNESIUM SERPL-MCNC: 2.4 MG/DL (ref 1.8–2.4)
PMV BLD: 6.8 FL (ref 7.6–11.3)
POTASSIUM SERPL-SCNC: 4 MMOL/L (ref 3.5–5.1)
PREALB SERPL-MCNC: 21.5 MG/DL (ref 20–40)
RBC # BLD: 3.32 M/UL (ref 3.86–4.86)

## 2021-10-07 RX ADMIN — MONTELUKAST SODIUM SCH MG: 10 TABLET, FILM COATED ORAL at 20:13

## 2021-10-07 RX ADMIN — CHOLECALCIFEROL TAB 25 MCG (1000 UNIT) SCH UNIT: 25 TAB at 09:48

## 2021-10-07 RX ADMIN — ATORVASTATIN CALCIUM SCH MG: 20 TABLET, FILM COATED ORAL at 20:12

## 2021-10-07 RX ADMIN — Medication SCH CAP: at 09:50

## 2021-10-07 RX ADMIN — DONEPEZIL HYDROCHLORIDE SCH MG: 5 TABLET ORAL at 09:54

## 2021-10-07 RX ADMIN — ASPIRIN SCH MG: 81 TABLET, COATED ORAL at 09:48

## 2021-10-07 RX ADMIN — LORATADINE SCH MG: 10 TABLET ORAL at 08:00

## 2021-10-07 RX ADMIN — MEMANTINE HYDROCHLORIDE SCH MG: 10 TABLET ORAL at 20:13

## 2021-10-07 RX ADMIN — SULFAMETHOXAZOLE AND TRIMETHOPRIM SCH TAB: 800; 160 TABLET ORAL at 09:48

## 2021-10-07 RX ADMIN — Medication SCH CAP: at 20:14

## 2021-10-07 RX ADMIN — Medication SCH MG: at 09:48

## 2021-10-07 RX ADMIN — MEMANTINE HYDROCHLORIDE SCH MG: 10 TABLET ORAL at 09:48

## 2021-10-07 RX ADMIN — LEVOTHYROXINE SODIUM SCH MG: 0.1 TABLET ORAL at 07:07

## 2021-10-07 RX ADMIN — APIXABAN SCH MG: 2.5 TABLET, FILM COATED ORAL at 09:54

## 2021-10-07 RX ADMIN — APIXABAN SCH MG: 2.5 TABLET, FILM COATED ORAL at 20:12

## 2021-10-07 RX ADMIN — Medication SCH CAP: at 20:13

## 2021-10-07 RX ADMIN — SULFAMETHOXAZOLE AND TRIMETHOPRIM SCH TAB: 800; 160 TABLET ORAL at 20:12

## 2021-10-07 RX ADMIN — TRAZODONE HYDROCHLORIDE SCH MG: 50 TABLET ORAL at 20:13

## 2021-10-07 RX ADMIN — Medication SCH MG: at 20:12

## 2021-10-07 NOTE — R.PN
PROGRESS NOTES



ENCOUNTER DATE AND TIME:



10/07/2021 19:43 (CDT)



NAME



BEHNKE, MARY



YOB: 1940



DATE OF ADMISSION:



10/04/2021 14:25 (CDT)



PARKINSON'S DZ Hypertensive chronic kidney disease I12.9, type 2 diabetes E11.22, Lumbar spinal steno
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



Parkinsonism, debility



SUBJECTIVE:



Pt denied any depression.

Pt denied any Shortness of Breath.

She reports doing well with all therapy. She denies significant pain or other problems. She has no ne
w complaints.

WBC 4.3, Hgb 10.6, Plt 207, Crt 1.90, prealbumin 21.5. UA shows esterase 2+, bacteria > 50.

Ambulated 1750' with standby assistance using a rolling walker. Up and down 15 steps with standby ass
istance.



VITAL SIGNS



SBP/DBP: 147/60

Pulse: 60

Resp: 16

Temperature: 97.8 F



MEDICATION ALLERGIES:



No Known Drug Allergies (NKDA)



ENVIRONMENTAL ALLERGIES:



- Substance Allergies

None Known

- Other Allergies

None Known



CONSULT:



Perform Neuro consult

Perform Primary care follow up to assess routine medical needs



NURSING:



- Shower

allowing shower

- Bladder

care per protocol

- Skin

care per protocol



ACTIVITIES



OOB only with supervision



THERAPIES:



- Dietary and Nutrition

Adequate Nutrition. Nutritional Education. Nutritional Supplements. 



- Occupational Therapy

Cognitive Retraining. Visual Perceptual Training. 



- Speech Therapy

Cognitive Training. Expressive Language Skills. Memory Strategies. Receptive Language Skills. Speech 
Intelligibility Training. 



- Physical Therapy

Evaluate and Treat. Gait Training. Balance Training. Transfer Training. LE Strengthening. Patient/Fam
anabel Education. 



PHYSICAL EXAM



- Gen

Alert and awake

Lying in bed

No apparent distress

Oriented to: person, time, and place

- Skin

No skin breakdown.



Normacephalic

- Eyes

No abnormalities

- ENMT

No abnormalities

- Neck

No abnormalities

- CVS

RRR

- Chest

No abnormalities

- Abd

Soft

- GI

nondistended



Deferred

- 

No abnormalities

- Ext

No significant edema

- MSK

4+/5 weakness in both lower extremities.

- Neuro

No focal deficits

- Psych

No abnormalities

- OTHER

She reports nontraumatic left hip and knee pain. Aggravated by lying on the left side.

Two view x-rays of the left hip and knee reveal no abnormalities.



ASSESSMENT:



Pt. is a 82 yo Right-handed female.On 01/31/2021 she was admitted to [facility] (formerly [facility]) with reyna
gnosis PARKINSON'S DZ .Her impairment category is Neurologic Conditions 03 -  Parkinsonism (03.2).Pre
-morbidly, Pt. was independent/mod-I in Locomotion, Safety Awareness, Social Cognition, and Transfers
 Control; and she had good Balance, Transfers Control, Self-Care, Sphincter Control, Communication, a
nd Endurance.Currently, she has deficits of Locomotion, Social Cognition, Safety Awareness, Balance, 
Transfers Control, Sphincter Control, Self-Care, Endurance, and Communication.Pt. is now referred to 
[facility] for acute in-patient rehabilitation in order to maximize patient's 
functional independence in activities of daily living, strength, ROM, and mobility.- Rehab Goal

Patient has realistic goal of being discharged at assistance level 7-Ind to reside at Home with  Fami
ly/Relatives.

MDM/PLAN:



- Physical Therapy

 Gait dysfunction - to improve, our physical therapists will perform initial evaluation of pt's statu
s upon admission and devise an individualized program for Gait Training, and Wheel Chair mobility

 Inability to transfer - to improve, our physical therapists will perform initial evaluation of pt's 
status upon admission and devise an individualized program for Bed mobility

 Need for home safety evaluation - to improve, our physical therapists will perform initial evaluatio
n of pt's status upon admission and devise an individualized program for Home Evaluation

 Need in caregiver upon discharge - to improve, our physical therapists will perform initial evaluati
on of pt's status upon admission and devise an individualized program for Caregiver Training

 New precaution - to improve, our physical therapists will perform initial evaluation of pt's status 
upon admission and devise an individualized program for Patient precaution education

 Edema - to improve, our physical therapists will perform initial evaluation of pt's status upon admi
ssion and devise an individualized program for Elevation Training, and Lymphedema Therapy

 Poor balance - to improve, our physical therapists will perform initial evaluation of pt's status up
on admission and devise an individualized program for Balance Training

 Poor endurance - to improve, our physical therapists will perform initial evaluation of pt's status 
upon admission and devise an individualized program for Endurance Training

 Weakness - to improve, our physical therapists will perform initial evaluation of pt's status upon a
dmission and devise an individualized program for Aquatic Therapy, Neuromuscular Reeducation, and Str
engthening

 Achieving independence - to improve, our physical therapists will perform initial evaluation of pt's
 status upon admission and devise an individualized program for Community Reintegration Activities

- Occupational Therapy

 ADL deficits - to improve, our occupation therapists will perform initial evaluation of pt's status 
upon admission and devise an individualized program for Bathing, Bed mobility, Community Reintegratio
n, Cooking, Dressing, Eating, Fine Motor Skills, Grooming, Homemaking, Kitchen Mobility, Laundry, Pat
ient Education, Safety Awareness, Splinting - Positioning, Transfers(Toilet, Tub, Shower), and Wheel 
Chair Management

 Cognitive deficits - to improve, our occupation therapists will perform initial evaluation of pt's s
tatus upon admission and devise an individualized program for Cognition - orientation

 Need for care giver - to improve, our occupation therapists will perform initial evaluation of pt's 
status upon admission and devise an individualized program for Caregiver Training

 Weakness - to improve, our occupation therapists will perform initial evaluation of pt's status upon
 admission and devise an individualized program for Aquatic Therapy, Balance, Endurance, UE ROM, and 
UE strengthening

- Other

 See attached MAR (Medication Administration Record)

- Diet Type

Continue Regular

- Diet - Liquid Texture

Continue Regular

- Tube Feed

Continue N/A

- N/A

Perform Neuro consult

Perform Primary care follow up to assess routine medical needs

- Diet - Solid Texture

Continue Regular

- Shower

 allowing shower

- Bladder

 care per protocol

- Skin

 care per protocol



FUNCTIONAL STATUS: UPDATED AT WEEKLY TEAM CONFERENCE



- Bladder

Same accident frequency: 7-Ind - No accidents in the past 7 days

- Bowel

Same accident frequency: 7-Ind - No accidents in the past 7 days

- Walking

Same score based on distance walked: 0(N/A)

- Wheelchair

Same score based on distance traveled: 0(N/A)



FUNCTIONAL STATUS:



- Self-Care

A. Eating    Ind   

B. Grooming    [place]   

C. Bathing    sup   

D. Dressing - Upper     sup   

E. Dressing - Lower     sup   

F. Toileting    sup   

- Sphincter Control

G. Bladder control     [place]   

H. Bowel control     [place]   

- Transfers Control

I. Bed/Chair/Wheelchair     Valentine   

J. Toilet    Valentine   

K. Tub/Shower    Valentine   

- Locomotion

L. Walk/Wheelchair (B)     sup   

M. Stairs    Valentine   

- Communication

N. Comprehension (B)     sup   

O. Expression (B)     sup   

- Social Cognition

P. Social Interaction    Ind   

Q. Problem Solving    sup   

R. Memory    sup   

- Endurance

Good

- Balance

Fair

- Safety Awareness

Fair



QI SCORES:



- Self-Care

A. Eating  06-Independent  

B. Oral hygiene  03-Partial/moderate assistance  

C. Toileting hygiene  03-Partial/moderate assistance  

E. Shower/bathe self  03-Partial/moderate assistance  

F. Upper body dressing  03-Partial/moderate assistance  

G. Lower body dressing  03-Partial/moderate assistance  

H. Putting on/taking off footwear  88-Not attempted due to medical condition or safety concerns  

- Mobility

A. Roll left and right  03-Partial/moderate assistance  

B. Sit to lying  03-Partial/moderate assistance  

C. Lying to sitting on side of bed  03-Partial/moderate assistance  

D. Sit to stand  03-Partial/moderate assistance  

E. Chair/bed-to-chair transfer  88-Not attempted due to medical condition or safety concerns  

F. Toilet transfer  03-Partial/moderate assistance  

G. Car transfer  03-Partial/moderate assistance  

I. Walk 10 feet  03-Partial/moderate assistance  

J. Walk 50 feet with two turns  88-Not attempted due to medical condition or safety concerns  

K. Walk 150 feet  03-Partial/moderate assistance  

L. Walking 10 feet on uneven surfaces  88-Not attempted due to medical condition or safety concerns  


M. 1 step (curb)  88-Not attempted due to medical condition or safety concerns  

N. 4 steps  88-Not attempted due to medical condition or safety concerns  

O. 12 steps  88-Not attempted due to medical condition or safety concerns  

P. Picking up object  88-Not attempted due to medical condition or safety concerns  

R. Wheel 50 feet with two turns  88-Not attempted due to medical condition or safety concerns  

S. Wheel 150 feet  88-Not attempted due to medical condition or safety concerns  

- Bladder and Bowel

Bladder continence      

Bowel continence      

- Endurance

Fair

- Balance

Fair

- Safety Awareness

Fair



[facility]:



Mobility, Endurance, Balance, Safety Awareness, and Self-Care



SIGNATURE PANEL:



Electronically signed by Dr. Jordy Lomeli M.D. on 10/07/2021 at 19:46 (CDT)

## 2021-10-08 RX ADMIN — Medication SCH CAP: at 08:20

## 2021-10-08 RX ADMIN — TRAZODONE HYDROCHLORIDE SCH MG: 50 TABLET ORAL at 21:05

## 2021-10-08 RX ADMIN — Medication SCH PATCH: at 07:23

## 2021-10-08 RX ADMIN — ATORVASTATIN CALCIUM SCH MG: 20 TABLET, FILM COATED ORAL at 21:05

## 2021-10-08 RX ADMIN — Medication SCH CAP: at 21:06

## 2021-10-08 RX ADMIN — Medication SCH MG: at 08:24

## 2021-10-08 RX ADMIN — DONEPEZIL HYDROCHLORIDE SCH MG: 5 TABLET ORAL at 08:24

## 2021-10-08 RX ADMIN — MONTELUKAST SODIUM SCH MG: 10 TABLET, FILM COATED ORAL at 21:06

## 2021-10-08 RX ADMIN — CHOLECALCIFEROL TAB 25 MCG (1000 UNIT) SCH UNIT: 25 TAB at 08:24

## 2021-10-08 RX ADMIN — MEMANTINE HYDROCHLORIDE SCH MG: 10 TABLET ORAL at 21:05

## 2021-10-08 RX ADMIN — MEMANTINE HYDROCHLORIDE SCH MG: 10 TABLET ORAL at 08:24

## 2021-10-08 RX ADMIN — Medication SCH CAP: at 21:07

## 2021-10-08 RX ADMIN — LORATADINE SCH MG: 10 TABLET ORAL at 08:00

## 2021-10-08 RX ADMIN — Medication SCH CAP: at 08:19

## 2021-10-08 RX ADMIN — LEVOTHYROXINE SODIUM SCH MG: 0.1 TABLET ORAL at 07:23

## 2021-10-08 RX ADMIN — SENNOSIDES AND DOCUSATE SODIUM PRN TAB: 8.6; 5 TABLET ORAL at 21:05

## 2021-10-08 RX ADMIN — SULFAMETHOXAZOLE AND TRIMETHOPRIM SCH TAB: 800; 160 TABLET ORAL at 08:24

## 2021-10-08 RX ADMIN — Medication SCH MG: at 21:05

## 2021-10-08 RX ADMIN — SULFAMETHOXAZOLE AND TRIMETHOPRIM SCH TAB: 800; 160 TABLET ORAL at 21:05

## 2021-10-08 RX ADMIN — ASPIRIN SCH MG: 81 TABLET, COATED ORAL at 08:24

## 2021-10-08 RX ADMIN — APIXABAN SCH MG: 2.5 TABLET, FILM COATED ORAL at 21:06

## 2021-10-08 RX ADMIN — APIXABAN SCH MG: 2.5 TABLET, FILM COATED ORAL at 08:24

## 2021-10-08 NOTE — P.RH.PN
Estimated Length of Stay: 10


Expected Discharge Date: 10/14/21


Discharge Disposition Plan: Home


Family Support: Yes


Long Term Goal: Mobility, Transfers, Self Care


Vital Signs: 


                                Last Vital Signs











Temp  98.7 F   10/07/21 20:49


 


Pulse  60   10/07/21 20:49


 


Resp  18   10/07/21 20:49


 


BP  120/49 L  10/07/21 20:49


 


Pulse Ox  96   10/07/21 20:49











Laboratory: 


                             Laboratory Last Values











WBC  4.30 K/uL (4.3-10.9)   10/07/21  04:20    


 


RBC  3.32 M/uL (3.86-4.86)  L  10/07/21  04:20    


 


Hgb  10.6 g/dL (12.0-15.0)  L  10/07/21  04:20    


 


Hct  30.8 % (36.0-45.0)  L  10/07/21  04:20    


 


MCV  92.9 fL ()   10/07/21  04:20    


 


MCH  31.8 pg (27.0-35.0)   10/07/21  04:20    


 


MCHC  34.3 g/dL (32.0-36.0)   10/07/21  04:20    


 


RDW  13.2 % (12.1-15.2)   10/07/21  04:20    


 


Plt Count  207 K/uL (152-406)   10/07/21  04:20    


 


MPV  6.8 fL (7.6-11.3)  L  10/07/21  04:20    


 


Neutrophils %  58.2 % (41.7-73.7)   10/07/21  04:20    


 


Lymphocytes %  15.6 % (15.3-44.8)   10/07/21  04:20    


 


Monocytes %  17.1 % (3.3-12.3)  H  10/07/21  04:20    


 


Eosinophils %  8.7 % (0-4.4)  H  10/07/21  04:20    


 


Basophils %  0.4 % (0-1.3)   10/07/21  04:20    


 


Absolute Neutrophils  2.5 K/uL (1.8-8.0)   10/07/21  04:20    


 


Segmented Neutrophils  65 % (40-80)   10/05/21  04:33    


 


Absolute Lymphocytes  0.7 K/uL (0.7-4.9)   10/07/21  04:20    


 


Lymphocytes  15 % (15-42)   10/05/21  04:33    


 


Monocytes  13 % (0-10)  H  10/05/21  04:33    


 


Absolute Monocytes  0.7 K/uL (0.1-1.3)   10/07/21  04:20    


 


Eosinophils  7 % (0-3)  H  10/05/21  04:33    


 


Absolute Eosinophils  0.4 K/uL (0-0.5)   10/07/21  04:20    


 


Absolute Basophils  0.0 K/uL (0-0.5)   10/07/21  04:20    


 


Platelet Estimate  Adeq   10/05/21  04:33    


 


Morphology Comment  Not seen  (NOT SEEN)   10/05/21  04:33    


 


Sodium  140 mmol/L (136-145)   10/07/21  04:20    


 


Potassium  4.0 mmol/L (3.5-5.1)   10/07/21  04:20    


 


Chloride  109 mmol/L ()  H  10/07/21  04:20    


 


Carbon Dioxide  27 mmol/L (21-32)   10/07/21  04:20    


 


BUN  30 mg/dL (7-18)  H  10/07/21  04:20    


 


Creatinine  1.90 mg/dL (0.55-1.3)  H  10/07/21  04:20    


 


Estimated GFR  25 mL/min (=/>90)  L  10/07/21  04:20    


 


Glucose  107 mg/dL ()  H  10/07/21  04:20    


 


Calcium  9.0 mg/dL (8.5-10.1)   10/07/21  04:20    


 


Magnesium  2.4 mg/dL (1.8-2.4)   10/07/21  04:20    


 


Albumin  3.3 g/dL (3.4-5.0)  L  10/07/21  04:20    


 


Prealbumin  21.5 mg/dL (20-40)   10/07/21  04:20    


 


Urine Color  Yellow  (Yellow)   10/04/21  20:14    


 


Urine Appearance  Clear  (Clear)   10/04/21  20:14    


 


Urine pH  7.0  (5.0-7.0)   10/04/21  20:14    


 


Ur Specific Gravity  <=1.005  (1.005-1.030)   10/04/21  20:14    


 


Glucose (UA)(Auto)  Negative  (Negative)   10/04/21  20:14    


 


Urine Ketones  Negative  (Negative)   10/04/21  20:14    


 


Urine Blood  Negative  (Negative)   10/04/21  20:14    


 


Urine Nitrite  Negative  (Negative)   10/04/21  20:14    


 


Urine Bilirubin  Negative  (Negative)   10/04/21  20:14    


 


Urine Urobilinogen  0.2 mg/dL (0.2-1.0)   10/04/21  20:14    


 


Ur Leukocyte Esterase  2+  (Negative)  H  10/04/21  20:14    


 


Urine RBC  None seen /HPF (NONE SEEN)   10/04/21  20:14    


 


Urine WBC  10-20 /HPF (<5)  H  10/04/21  20:14    


 


Ur Squamous Epith Cells  <5 /HPF (NONE SEEN)   10/04/21  20:14    


 


Urine Bacteria  >50 /HPF (<20)  H  10/04/21  20:14    


 


Urine Culture Reflexed  Not needed   10/04/21  20:14    


 


Urine Total Protein  Negative  (Negative)   10/04/21  20:14    











Weight: 164 lb 4.8 oz


Wound Present: No


Closed Surgical Incision Present: No


Negative Pressure Wound Therapy Present: No


Physician Update: Her labs were reviewed and are stable. She is standby 

assistance with ADLs. Walking 750', 15 stairs, with modified independence.


Functional Improvement: Patient completes tasks w/ good phyical ability, however

requires assistance for safety awareness.  Patient presents w/ slight 

impulsivity, and minor memory issues, requiring VC for correction.  Patient 

presents w/ good work ethic and attitude.


Summary: Patient's care plan and long term goals have been reviewed and revised 

as necessary. Please see the Rehabilitation Signature page for all necessary 

signatures.

## 2021-10-09 RX ADMIN — APIXABAN SCH MG: 2.5 TABLET, FILM COATED ORAL at 19:46

## 2021-10-09 RX ADMIN — Medication SCH CAP: at 19:45

## 2021-10-09 RX ADMIN — Medication SCH CAP: at 08:06

## 2021-10-09 RX ADMIN — Medication SCH PATCH: at 07:24

## 2021-10-09 RX ADMIN — TRAZODONE HYDROCHLORIDE SCH MG: 50 TABLET ORAL at 19:46

## 2021-10-09 RX ADMIN — BENZOCAINE AND MENTHOL PRN LOZ: 15; 3.6 LOZENGE ORAL at 19:51

## 2021-10-09 RX ADMIN — BENZOCAINE AND MENTHOL PRN LOZ: 15; 3.6 LOZENGE ORAL at 13:53

## 2021-10-09 RX ADMIN — MEMANTINE HYDROCHLORIDE SCH MG: 10 TABLET ORAL at 19:46

## 2021-10-09 RX ADMIN — IRON SUPPLEMENT SCH MG: 325 TABLET ORAL at 08:07

## 2021-10-09 RX ADMIN — DONEPEZIL HYDROCHLORIDE SCH MG: 5 TABLET ORAL at 08:07

## 2021-10-09 RX ADMIN — ATORVASTATIN CALCIUM SCH MG: 20 TABLET, FILM COATED ORAL at 19:46

## 2021-10-09 RX ADMIN — SULFAMETHOXAZOLE AND TRIMETHOPRIM SCH TAB: 800; 160 TABLET ORAL at 08:07

## 2021-10-09 RX ADMIN — CHOLECALCIFEROL TAB 25 MCG (1000 UNIT) SCH UNIT: 25 TAB at 08:07

## 2021-10-09 RX ADMIN — Medication SCH MG: at 08:07

## 2021-10-09 RX ADMIN — MEMANTINE HYDROCHLORIDE SCH MG: 10 TABLET ORAL at 08:08

## 2021-10-09 RX ADMIN — APIXABAN SCH MG: 2.5 TABLET, FILM COATED ORAL at 08:07

## 2021-10-09 RX ADMIN — Medication SCH MG: at 19:46

## 2021-10-09 RX ADMIN — SULFAMETHOXAZOLE AND TRIMETHOPRIM SCH TAB: 800; 160 TABLET ORAL at 19:46

## 2021-10-09 RX ADMIN — LORATADINE SCH MG: 10 TABLET ORAL at 08:08

## 2021-10-09 RX ADMIN — Medication SCH TAB: at 08:08

## 2021-10-09 RX ADMIN — MONTELUKAST SODIUM SCH MG: 10 TABLET, FILM COATED ORAL at 19:45

## 2021-10-09 RX ADMIN — ASPIRIN SCH MG: 81 TABLET, COATED ORAL at 08:07

## 2021-10-09 RX ADMIN — LEVOTHYROXINE SODIUM SCH MG: 0.1 TABLET ORAL at 06:35

## 2021-10-10 RX ADMIN — MONTELUKAST SODIUM SCH MG: 10 TABLET, FILM COATED ORAL at 19:46

## 2021-10-10 RX ADMIN — SULFAMETHOXAZOLE AND TRIMETHOPRIM SCH TAB: 800; 160 TABLET ORAL at 08:02

## 2021-10-10 RX ADMIN — APIXABAN SCH MG: 2.5 TABLET, FILM COATED ORAL at 19:47

## 2021-10-10 RX ADMIN — LEVOTHYROXINE SODIUM SCH MG: 0.1 TABLET ORAL at 06:24

## 2021-10-10 RX ADMIN — APIXABAN SCH MG: 2.5 TABLET, FILM COATED ORAL at 08:01

## 2021-10-10 RX ADMIN — Medication SCH TAB: at 08:01

## 2021-10-10 RX ADMIN — SULFAMETHOXAZOLE AND TRIMETHOPRIM SCH TAB: 800; 160 TABLET ORAL at 19:47

## 2021-10-10 RX ADMIN — Medication SCH CAP: at 19:46

## 2021-10-10 RX ADMIN — TRAZODONE HYDROCHLORIDE SCH MG: 50 TABLET ORAL at 19:47

## 2021-10-10 RX ADMIN — DONEPEZIL HYDROCHLORIDE SCH MG: 5 TABLET ORAL at 08:00

## 2021-10-10 RX ADMIN — ATORVASTATIN CALCIUM SCH MG: 20 TABLET, FILM COATED ORAL at 19:47

## 2021-10-10 RX ADMIN — Medication SCH CAP: at 08:00

## 2021-10-10 RX ADMIN — ASPIRIN SCH MG: 81 TABLET, COATED ORAL at 08:01

## 2021-10-10 RX ADMIN — BENZOCAINE AND MENTHOL PRN LOZ: 15; 3.6 LOZENGE ORAL at 19:52

## 2021-10-10 RX ADMIN — BENZOCAINE AND MENTHOL PRN LOZ: 15; 3.6 LOZENGE ORAL at 09:38

## 2021-10-10 RX ADMIN — Medication SCH MG: at 08:04

## 2021-10-10 RX ADMIN — Medication SCH PATCH: at 06:24

## 2021-10-10 RX ADMIN — MEMANTINE HYDROCHLORIDE SCH MG: 10 TABLET ORAL at 08:02

## 2021-10-10 RX ADMIN — Medication SCH MG: at 19:47

## 2021-10-10 RX ADMIN — CHOLECALCIFEROL TAB 25 MCG (1000 UNIT) SCH UNIT: 25 TAB at 08:01

## 2021-10-10 RX ADMIN — MEMANTINE HYDROCHLORIDE SCH MG: 10 TABLET ORAL at 19:47

## 2021-10-10 RX ADMIN — IRON SUPPLEMENT SCH MG: 325 TABLET ORAL at 08:01

## 2021-10-10 RX ADMIN — LORATADINE SCH MG: 10 TABLET ORAL at 08:02

## 2021-10-11 LAB — UA DIPSTICK W REFLEX MICRO PNL UR: (no result)

## 2021-10-11 RX ADMIN — Medication SCH MG: at 18:50

## 2021-10-11 RX ADMIN — APIXABAN SCH MG: 2.5 TABLET, FILM COATED ORAL at 18:50

## 2021-10-11 RX ADMIN — ASPIRIN SCH MG: 81 TABLET, COATED ORAL at 08:31

## 2021-10-11 RX ADMIN — Medication SCH MG: at 08:31

## 2021-10-11 RX ADMIN — LORATADINE SCH MG: 10 TABLET ORAL at 08:31

## 2021-10-11 RX ADMIN — MONTELUKAST SODIUM SCH MG: 10 TABLET, FILM COATED ORAL at 18:50

## 2021-10-11 RX ADMIN — LEVOTHYROXINE SODIUM SCH MG: 0.1 TABLET ORAL at 07:32

## 2021-10-11 RX ADMIN — DONEPEZIL HYDROCHLORIDE SCH MG: 5 TABLET ORAL at 08:30

## 2021-10-11 RX ADMIN — ATORVASTATIN CALCIUM SCH MG: 20 TABLET, FILM COATED ORAL at 18:50

## 2021-10-11 RX ADMIN — IRON SUPPLEMENT SCH MG: 325 TABLET ORAL at 08:32

## 2021-10-11 RX ADMIN — APIXABAN SCH MG: 2.5 TABLET, FILM COATED ORAL at 08:31

## 2021-10-11 RX ADMIN — Medication SCH CAP: at 18:51

## 2021-10-11 RX ADMIN — MEMANTINE HYDROCHLORIDE SCH MG: 10 TABLET ORAL at 08:31

## 2021-10-11 RX ADMIN — TRAZODONE HYDROCHLORIDE SCH MG: 50 TABLET ORAL at 18:50

## 2021-10-11 RX ADMIN — Medication SCH PATCH: at 08:32

## 2021-10-11 RX ADMIN — SENNOSIDES AND DOCUSATE SODIUM PRN TAB: 8.6; 5 TABLET ORAL at 18:50

## 2021-10-11 RX ADMIN — Medication SCH CAP: at 18:52

## 2021-10-11 RX ADMIN — CHOLECALCIFEROL TAB 25 MCG (1000 UNIT) SCH UNIT: 25 TAB at 08:31

## 2021-10-11 RX ADMIN — MEMANTINE HYDROCHLORIDE SCH MG: 10 TABLET ORAL at 18:50

## 2021-10-11 RX ADMIN — Medication SCH TAB: at 08:30

## 2021-10-11 RX ADMIN — Medication SCH CAP: at 08:28

## 2021-10-12 RX ADMIN — Medication SCH CAP: at 19:23

## 2021-10-12 RX ADMIN — MONTELUKAST SODIUM SCH MG: 10 TABLET, FILM COATED ORAL at 19:22

## 2021-10-12 RX ADMIN — Medication SCH CAP: at 08:33

## 2021-10-12 RX ADMIN — ASPIRIN SCH MG: 81 TABLET, COATED ORAL at 08:30

## 2021-10-12 RX ADMIN — Medication SCH MG: at 19:23

## 2021-10-12 RX ADMIN — Medication SCH TAB: at 08:31

## 2021-10-12 RX ADMIN — ATORVASTATIN CALCIUM SCH MG: 20 TABLET, FILM COATED ORAL at 19:22

## 2021-10-12 RX ADMIN — MEMANTINE HYDROCHLORIDE SCH MG: 10 TABLET ORAL at 08:31

## 2021-10-12 RX ADMIN — CHOLECALCIFEROL TAB 25 MCG (1000 UNIT) SCH UNIT: 25 TAB at 08:29

## 2021-10-12 RX ADMIN — IRON SUPPLEMENT SCH MG: 325 TABLET ORAL at 08:28

## 2021-10-12 RX ADMIN — LEVOTHYROXINE SODIUM SCH MG: 0.1 TABLET ORAL at 06:26

## 2021-10-12 RX ADMIN — MEMANTINE HYDROCHLORIDE SCH MG: 10 TABLET ORAL at 19:23

## 2021-10-12 RX ADMIN — Medication SCH CAP: at 08:34

## 2021-10-12 RX ADMIN — DONEPEZIL HYDROCHLORIDE SCH MG: 5 TABLET ORAL at 08:31

## 2021-10-12 RX ADMIN — Medication SCH PATCH: at 06:26

## 2021-10-12 RX ADMIN — SENNOSIDES AND DOCUSATE SODIUM PRN TAB: 8.6; 5 TABLET ORAL at 19:22

## 2021-10-12 RX ADMIN — APIXABAN SCH MG: 2.5 TABLET, FILM COATED ORAL at 19:22

## 2021-10-12 RX ADMIN — APIXABAN SCH MG: 2.5 TABLET, FILM COATED ORAL at 08:31

## 2021-10-12 RX ADMIN — TRAZODONE HYDROCHLORIDE SCH MG: 50 TABLET ORAL at 19:23

## 2021-10-12 RX ADMIN — Medication SCH MG: at 08:29

## 2021-10-12 RX ADMIN — LORATADINE SCH MG: 10 TABLET ORAL at 08:30

## 2021-10-12 NOTE — R.PN
PROGRESS NOTES



ENCOUNTER DATE AND TIME:



10/12/2021 19:01 (CDT)



NAME



BEHNKE, MARY



YOB: 1940



DATE OF ADMISSION:



10/04/2021 14:25 (CDT)



PARKINSON'S DZ Hypertensive chronic kidney disease I12.9, type 2 diabetes E11.22, Lumbar spinal steno
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



Parkinsonism, debility



SUBJECTIVE:



Pt denied any depression.

Pt denied any Shortness of Breath.

She reports doing well with all therapy. She denies significant pain or other problems. She has no ne
w complaints.

WBC 4.3, Hgb 10.6, Plt 207, Crt 1.90, prealbumin 21.5. UA shows esterase 2+, bacteria > 50.

Ambulated 1750' with modified independence using a rolling walker. Up and down 20 steps with contact 
guard assistance.



VITAL SIGNS



SBP/DBP: 129/54

Pulse: 60

Resp: 16

Temperature: 98.6 F



MEDICATION ALLERGIES:



No Known Drug Allergies (NKDA)



ENVIRONMENTAL ALLERGIES:



- Substance Allergies

None Known

- Other Allergies

None Known



CONSULT:



Perform Neuro consult

Perform Primary care follow up to assess routine medical needs



NURSING:



- Shower

allowing shower

- Bladder

care per protocol

- Skin

care per protocol



ACTIVITIES



OOB only with supervision



THERAPIES:



- Dietary and Nutrition

Adequate Nutrition. Nutritional Education. Nutritional Supplements. 



- Occupational Therapy

Cognitive Retraining. Visual Perceptual Training. 



- Speech Therapy

Cognitive Training. Expressive Language Skills. Memory Strategies. Receptive Language Skills. Speech 
Intelligibility Training. 



- Physical Therapy

Evaluate and Treat. Gait Training. Balance Training. Transfer Training. LE Strengthening. Patient/Fam
anabel Education. 



PHYSICAL EXAM



- Gen

Alert and awake

Lying in bed

No apparent distress

Oriented to: person, time, and place

- Skin

No skin breakdown.



Normacephalic

- Eyes

No abnormalities

- ENMT

No abnormalities

- Neck

No abnormalities

- CVS

RRR

- Chest

No abnormalities

- Abd

Soft

- GI

nondistended



Deferred

- 

No abnormalities

- Ext

No significant edema

- MSK

4+/5 weakness in both lower extremities.

- Neuro

No focal deficits

- Psych

No abnormalities

- OTHER

She reports nontraumatic left hip and knee pain. Aggravated by lying on the left side.

Two view x-rays of the left hip and knee reveal no abnormalities.



ASSESSMENT:



Pt. is a 80 yo Right-handed female.On 01/31/2021 she was admitted to [facility] with reyna
gnosis PARKINSON'S DZ .Her impairment category is Neurologic Conditions 03 -  Parkinsonism (03.2).Pre
-morbidly, Pt. was independent/mod-I in Locomotion, Safety Awareness, Social Cognition, and Transfers
 Control; and she had good Balance, Transfers Control, Self-Care, Sphincter Control, Communication, a
nd Endurance.Currently, she has deficits of Locomotion, Social Cognition, Safety Awareness, Balance, 
Transfers Control, Sphincter Control, Self-Care, Endurance, and Communication.Pt. is now referred to 
[facility] for acute in-patient rehabilitation in order to maximize patient's 
functional independence in activities of daily living, strength, ROM, and mobility.- Rehab Goal

Patient has realistic goal of being discharged at assistance level 7-Ind to reside at Home with  Fami
ly/Relatives.

MDM/PLAN:



- Physical Therapy

 Gait dysfunction - to improve, our physical therapists will perform initial evaluation of pt's statu
s upon admission and devise an individualized program for Gait Training, and Wheel Chair mobility

 Inability to transfer - to improve, our physical therapists will perform initial evaluation of pt's 
status upon admission and devise an individualized program for Bed mobility

 Need for home safety evaluation - to improve, our physical therapists will perform initial evaluatio
n of pt's status upon admission and devise an individualized program for Home Evaluation

 Need in caregiver upon discharge - to improve, our physical therapists will perform initial evaluati
on of pt's status upon admission and devise an individualized program for Caregiver Training

 New precaution - to improve, our physical therapists will perform initial evaluation of pt's status 
upon admission and devise an individualized program for Patient precaution education

 Edema - to improve, our physical therapists will perform initial evaluation of pt's status upon admi
ssion and devise an individualized program for Elevation Training, and Lymphedema Therapy

 Poor balance - to improve, our physical therapists will perform initial evaluation of pt's status up
on admission and devise an individualized program for Balance Training

 Poor endurance - to improve, our physical therapists will perform initial evaluation of pt's status 
upon admission and devise an individualized program for Endurance Training

 Weakness - to improve, our physical therapists will perform initial evaluation of pt's status upon a
dmission and devise an individualized program for Aquatic Therapy, Neuromuscular Reeducation, and Str
engthening

 Achieving independence - to improve, our physical therapists will perform initial evaluation of pt's
 status upon admission and devise an individualized program for Community Reintegration Activities

- Occupational Therapy

 ADL deficits - to improve, our occupation therapists will perform initial evaluation of pt's status 
upon admission and devise an individualized program for Bathing, Bed mobility, Community Reintegratio
n, Cooking, Dressing, Eating, Fine Motor Skills, Grooming, Homemaking, Kitchen Mobility, Laundry, Pat
ient Education, Safety Awareness, Splinting - Positioning, Transfers(Toilet, Tub, Shower), and Wheel 
Chair Management

 Cognitive deficits - to improve, our occupation therapists will perform initial evaluation of pt's s
tatus upon admission and devise an individualized program for Cognition - orientation

 Need for care giver - to improve, our occupation therapists will perform initial evaluation of pt's 
status upon admission and devise an individualized program for Caregiver Training

 Weakness - to improve, our occupation therapists will perform initial evaluation of pt's status upon
 admission and devise an individualized program for Aquatic Therapy, Balance, Endurance, UE ROM, and 
UE strengthening

- Other

 See attached MAR (Medication Administration Record)

- Diet Type

Continue Regular

- Diet - Liquid Texture

Continue Regular

- Tube Feed

Continue N/A

- N/A

Perform Neuro consult

Perform Primary care follow up to assess routine medical needs

- Diet - Solid Texture

Continue Regular

- Shower

 allowing shower

- Bladder

 care per protocol

- Skin

 care per protocol



FUNCTIONAL STATUS: UPDATED AT WEEKLY TEAM CONFERENCE



- Bladder

Same accident frequency: 7-Ind - No accidents in the past 7 days

- Bowel

Same accident frequency: 7-Ind - No accidents in the past 7 days

- Walking

Same score based on distance walked: 0(N/A)

- Wheelchair

Same score based on distance traveled: 0(N/A)



FUNCTIONAL STATUS:



- Self-Care

A. Eating    Ind   

B. Grooming    [place]   

C. Bathing    sup   

D. Dressing - Upper     sup   

E. Dressing - Lower     sup   

F. Toileting    sup   

- Sphincter Control

G. Bladder control     [place]   

H. Bowel control     [place]   

- Transfers Control

I. Bed/Chair/Wheelchair     Valentine   

J. Toilet    Valentine   

K. Tub/Shower    Valentine   

- Locomotion

L. Walk/Wheelchair (B)     sup   

M. Stairs    Valentine   

- Communication

N. Comprehension (B)     sup   

O. Expression (B)     sup   

- Social Cognition

P. Social Interaction    Ind   

Q. Problem Solving    sup   

R. Memory    sup   

- Endurance

Good

- Balance

Fair

- Safety Awareness

Fair



QI SCORES:



- Self-Care

A. Eating  06-Independent  

B. Oral hygiene  03-Partial/moderate assistance  

C. Toileting hygiene  03-Partial/moderate assistance  

E. Shower/bathe self  03-Partial/moderate assistance  

F. Upper body dressing  03-Partial/moderate assistance  

G. Lower body dressing  03-Partial/moderate assistance  

H. Putting on/taking off footwear  88-Not attempted due to medical condition or safety concerns  

- Mobility

A. Roll left and right  03-Partial/moderate assistance  

B. Sit to lying  03-Partial/moderate assistance  

C. Lying to sitting on side of bed  03-Partial/moderate assistance  

D. Sit to stand  03-Partial/moderate assistance  

E. Chair/bed-to-chair transfer  88-Not attempted due to medical condition or safety concerns  

F. Toilet transfer  03-Partial/moderate assistance  

G. Car transfer  03-Partial/moderate assistance  

I. Walk 10 feet  03-Partial/moderate assistance  

J. Walk 50 feet with two turns  88-Not attempted due to medical condition or safety concerns  

K. Walk 150 feet  03-Partial/moderate assistance  

L. Walking 10 feet on uneven surfaces  88-Not attempted due to medical condition or safety concerns  


M. 1 step (curb)  88-Not attempted due to medical condition or safety concerns  

N. 4 steps  88-Not attempted due to medical condition or safety concerns  

O. 12 steps  88-Not attempted due to medical condition or safety concerns  

P. Picking up object  88-Not attempted due to medical condition or safety concerns  

R. Wheel 50 feet with two turns  88-Not attempted due to medical condition or safety concerns  

S. Wheel 150 feet  88-Not attempted due to medical condition or safety concerns  

- Bladder and Bowel

Bladder continence      

Bowel continence      

- Endurance

Fair

- Balance

Fair

- Safety Awareness

Fair



[facility]:



Mobility, Endurance, Balance, Safety Awareness, and Self-Care



SIGNATURE PANEL:



Electronically signed by Dr. Jordy Lomeli M.D. on 10/12/2021 at 19:03 (CDT)

## 2021-10-13 RX ADMIN — SENNOSIDES AND DOCUSATE SODIUM PRN TAB: 8.6; 5 TABLET ORAL at 20:14

## 2021-10-13 RX ADMIN — APIXABAN SCH MG: 2.5 TABLET, FILM COATED ORAL at 20:15

## 2021-10-13 RX ADMIN — MEMANTINE HYDROCHLORIDE SCH MG: 10 TABLET ORAL at 20:15

## 2021-10-13 RX ADMIN — IRON SUPPLEMENT SCH MG: 325 TABLET ORAL at 07:54

## 2021-10-13 RX ADMIN — MEMANTINE HYDROCHLORIDE SCH MG: 10 TABLET ORAL at 07:54

## 2021-10-13 RX ADMIN — Medication SCH PATCH: at 10:32

## 2021-10-13 RX ADMIN — Medication SCH CAP: at 20:16

## 2021-10-13 RX ADMIN — Medication SCH TAB: at 07:54

## 2021-10-13 RX ADMIN — TRAZODONE HYDROCHLORIDE SCH MG: 50 TABLET ORAL at 20:14

## 2021-10-13 RX ADMIN — LORATADINE SCH MG: 10 TABLET ORAL at 07:54

## 2021-10-13 RX ADMIN — ASPIRIN SCH MG: 81 TABLET, COATED ORAL at 07:54

## 2021-10-13 RX ADMIN — BENZOCAINE AND MENTHOL PRN LOZ: 15; 3.6 LOZENGE ORAL at 20:24

## 2021-10-13 RX ADMIN — MONTELUKAST SODIUM SCH MG: 10 TABLET, FILM COATED ORAL at 20:14

## 2021-10-13 RX ADMIN — LEVOTHYROXINE SODIUM SCH MG: 0.1 TABLET ORAL at 06:33

## 2021-10-13 RX ADMIN — Medication SCH MG: at 07:54

## 2021-10-13 RX ADMIN — DONEPEZIL HYDROCHLORIDE SCH MG: 5 TABLET ORAL at 07:54

## 2021-10-13 RX ADMIN — CHOLECALCIFEROL TAB 25 MCG (1000 UNIT) SCH UNIT: 25 TAB at 07:53

## 2021-10-13 RX ADMIN — Medication SCH MG: at 20:15

## 2021-10-13 RX ADMIN — Medication SCH CAP: at 07:52

## 2021-10-13 RX ADMIN — APIXABAN SCH MG: 2.5 TABLET, FILM COATED ORAL at 07:54

## 2021-10-13 RX ADMIN — ATORVASTATIN CALCIUM SCH MG: 20 TABLET, FILM COATED ORAL at 20:15

## 2021-10-13 NOTE — R.PN
PROGRESS NOTES



ENCOUNTER DATE AND TIME:



10/13/2021 20:41 (CDT)



NAME



BEHNKE, MARY



YOB: 1940



DATE OF ADMISSION:



10/04/2021 14:25 (CDT)



PARKINSON'S DZ Hypertensive chronic kidney disease I12.9, type 2 diabetes E11.22, Lumbar spinal steno
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



Parkinsonism, debility



SUBJECTIVE:



Pt denied any depression.

Pt denied any Shortness of Breath.

She reports doing well with all therapy. She denies significant pain or other problems. She has no ne
w complaints.

WBC 4.3, Hgb 10.6, Plt 207, Crt 1.90, prealbumin 21.5. UA shows esterase 2+, bacteria > 50.

Ambulated 550' with modified independence using a rolling walker.



VITAL SIGNS



SBP/DBP: 124/54

Pulse: 64

Resp: 15

Temperature: 98.5 F



MEDICATION ALLERGIES:



No Known Drug Allergies (NKDA)



ENVIRONMENTAL ALLERGIES:



- Substance Allergies

None Known

- Other Allergies

None Known



CONSULT:



Perform Neuro consult

Perform Primary care follow up to assess routine medical needs



NURSING:



- Shower

allowing shower

- Bladder

care per protocol

- Skin

care per protocol



ACTIVITIES



OOB only with supervision



THERAPIES:



- Dietary and Nutrition

Adequate Nutrition. Nutritional Education. Nutritional Supplements. 



- Occupational Therapy

Cognitive Retraining. Visual Perceptual Training. 



- Speech Therapy

Cognitive Training. Expressive Language Skills. Memory Strategies. Receptive Language Skills. Speech 
Intelligibility Training. 



- Physical Therapy

Evaluate and Treat. Gait Training. Balance Training. Transfer Training. LE Strengthening. Patient/Fam
anabel Education. 



PHYSICAL EXAM



- Gen

Alert and awake

Lying in bed

No apparent distress

Oriented to: person, time, and place

- Skin

No skin breakdown.



Normacephalic

- Eyes

No abnormalities

- ENMT

No abnormalities

- Neck

No abnormalities

- CVS

RRR

- Chest

No abnormalities

- Abd

Soft

- GI

nondistended



Deferred

- 

No abnormalities

- Ext

No significant edema

- MSK

4+/5 weakness in both lower extremities.

- Neuro

No focal deficits

- Psych

No abnormalities

- OTHER

She reports nontraumatic left hip and knee pain. Aggravated by lying on the left side.

Two view x-rays of the left hip and knee reveal no abnormalities.



ASSESSMENT:



Pt. is a 82 yo Right-handed female.On 01/31/2021 she was admitted to [facility] with reyna
gnosis PARKINSON'S DZ .Her impairment category is Neurologic Conditions 03 -  Parkinsonism (03.2).Pre
-morbidly, Pt. was independent/mod-I in Locomotion, Safety Awareness, Social Cognition, and Transfers
 Control; and she had good Balance, Transfers Control, Self-Care, Sphincter Control, Communication, a
nd Endurance.Currently, she has deficits of Locomotion, Social Cognition, Safety Awareness, Balance, 
Transfers Control, Sphincter Control, Self-Care, Endurance, and Communication.Pt. is now referred to 
[facility] for acute in-patient rehabilitation in order to maximize patient's 
functional independence in activities of daily living, strength, ROM, and mobility.- Rehab Goal

Patient has realistic goal of being discharged at assistance level 7-Ind to reside at Home with  Fami
ly/Relatives.

MDM/PLAN:



- Physical Therapy

 Gait dysfunction - to improve, our physical therapists will perform initial evaluation of pt's statu
s upon admission and devise an individualized program for Gait Training, and Wheel Chair mobility

 Inability to transfer - to improve, our physical therapists will perform initial evaluation of pt's 
status upon admission and devise an individualized program for Bed mobility

 Need for home safety evaluation - to improve, our physical therapists will perform initial evaluatio
n of pt's status upon admission and devise an individualized program for Home Evaluation

 Need in caregiver upon discharge - to improve, our physical therapists will perform initial evaluati
on of pt's status upon admission and devise an individualized program for Caregiver Training

 New precaution - to improve, our physical therapists will perform initial evaluation of pt's status 
upon admission and devise an individualized program for Patient precaution education

 Edema - to improve, our physical therapists will perform initial evaluation of pt's status upon admi
ssion and devise an individualized program for Elevation Training, and Lymphedema Therapy

 Poor balance - to improve, our physical therapists will perform initial evaluation of pt's status up
on admission and devise an individualized program for Balance Training

 Poor endurance - to improve, our physical therapists will perform initial evaluation of pt's status 
upon admission and devise an individualized program for Endurance Training

 Weakness - to improve, our physical therapists will perform initial evaluation of pt's status upon a
dmission and devise an individualized program for Aquatic Therapy, Neuromuscular Reeducation, and Str
engthening

 Achieving independence - to improve, our physical therapists will perform initial evaluation of pt's
 status upon admission and devise an individualized program for Community Reintegration Activities

- Occupational Therapy

 ADL deficits - to improve, our occupation therapists will perform initial evaluation of pt's status 
upon admission and devise an individualized program for Bathing, Bed mobility, Community Reintegratio
n, Cooking, Dressing, Eating, Fine Motor Skills, Grooming, Homemaking, Kitchen Mobility, Laundry, Pat
ient Education, Safety Awareness, Splinting - Positioning, Transfers(Toilet, Tub, Shower), and Wheel 
Chair Management

 Cognitive deficits - to improve, our occupation therapists will perform initial evaluation of pt's s
tatus upon admission and devise an individualized program for Cognition - orientation

 Need for care giver - to improve, our occupation therapists will perform initial evaluation of pt's 
status upon admission and devise an individualized program for Caregiver Training

 Weakness - to improve, our occupation therapists will perform initial evaluation of pt's status upon
 admission and devise an individualized program for Aquatic Therapy, Balance, Endurance, UE ROM, and 
UE strengthening

- Other

 See attached MAR (Medication Administration Record)

- Diet Type

Continue Regular

- Diet - Liquid Texture

Continue Regular

- Tube Feed

Continue N/A

- N/A

Perform Neuro consult

Perform Primary care follow up to assess routine medical needs

- Diet - Solid Texture

Continue Regular

- Shower

 allowing shower

- Bladder

 care per protocol

- Skin

 care per protocol



FUNCTIONAL STATUS: UPDATED AT WEEKLY TEAM CONFERENCE



- Bladder

Same accident frequency: 7-Ind - No accidents in the past 7 days

- Bowel

Same accident frequency: 7-Ind - No accidents in the past 7 days

- Walking

Same score based on distance walked: 0(N/A)

- Wheelchair

Same score based on distance traveled: 0(N/A)



FUNCTIONAL STATUS:



- Self-Care

A. Eating    Ind   

B. Grooming    [place]   

C. Bathing    sup   

D. Dressing - Upper     sup   

E. Dressing - Lower     sup   

F. Toileting    sup   

- Sphincter Control

G. Bladder control     [place]   

H. Bowel control     [place]   

- Transfers Control

I. Bed/Chair/Wheelchair     Valentine   

J. Toilet    Valentine   

K. Tub/Shower    Valentine   

- Locomotion

L. Walk/Wheelchair (B)     sup   

M. Stairs    Valentine   

- Communication

N. Comprehension (B)     sup   

O. Expression (B)     sup   

- Social Cognition

P. Social Interaction    Ind   

Q. Problem Solving    sup   

R. Memory    sup   

- Endurance

Good

- Balance

Fair

- Safety Awareness

Fair



QI SCORES:



- Self-Care

A. Eating  06-Independent  

B. Oral hygiene  03-Partial/moderate assistance  

C. Toileting hygiene  03-Partial/moderate assistance  

E. Shower/bathe self  03-Partial/moderate assistance  

F. Upper body dressing  03-Partial/moderate assistance  

G. Lower body dressing  03-Partial/moderate assistance  

H. Putting on/taking off footwear  88-Not attempted due to medical condition or safety concerns  

- Mobility

A. Roll left and right  03-Partial/moderate assistance  

B. Sit to lying  03-Partial/moderate assistance  

C. Lying to sitting on side of bed  03-Partial/moderate assistance  

D. Sit to stand  03-Partial/moderate assistance  

E. Chair/bed-to-chair transfer  88-Not attempted due to medical condition or safety concerns  

F. Toilet transfer  03-Partial/moderate assistance  

G. Car transfer  03-Partial/moderate assistance  

I. Walk 10 feet  03-Partial/moderate assistance  

J. Walk 50 feet with two turns  88-Not attempted due to medical condition or safety concerns  

K. Walk 150 feet  03-Partial/moderate assistance  

L. Walking 10 feet on uneven surfaces  88-Not attempted due to medical condition or safety concerns  


M. 1 step (curb)  88-Not attempted due to medical condition or safety concerns  

N. 4 steps  88-Not attempted due to medical condition or safety concerns  

O. 12 steps  88-Not attempted due to medical condition or safety concerns  

P. Picking up object  88-Not attempted due to medical condition or safety concerns  

R. Wheel 50 feet with two turns  88-Not attempted due to medical condition or safety concerns  

S. Wheel 150 feet  88-Not attempted due to medical condition or safety concerns  

- Bladder and Bowel

Bladder continence      

Bowel continence      

- Endurance

Fair

- Balance

Fair

- Safety Awareness

Fair



[facility]IC[facility]:



Mobility, Endurance, Balance, Safety Awareness, and Self-Care



SIGNATURE PANEL:



Electronically signed by Dr. Jordy Lomeli M.D. on 10/13/2021 at 20:43 (CDT)

## 2021-10-14 VITALS — SYSTOLIC BLOOD PRESSURE: 120 MMHG | DIASTOLIC BLOOD PRESSURE: 55 MMHG | TEMPERATURE: 98.1 F

## 2021-10-14 LAB
ALBUMIN SERPL BCP-MCNC: 3.2 G/DL (ref 3.4–5)
BUN BLD-MCNC: 34 MG/DL (ref 7–18)
GLUCOSE SERPLBLD-MCNC: 95 MG/DL (ref 74–106)
HCT VFR BLD CALC: 30.2 % (ref 36–45)
LYMPHOCYTES # SPEC AUTO: 1 K/UL (ref 0.7–4.9)
MAGNESIUM SERPL-MCNC: 2.7 MG/DL (ref 1.8–2.4)
MORPHOLOGY BLD-IMP: (no result)
PMV BLD: 6.5 FL (ref 7.6–11.3)
POTASSIUM SERPL-SCNC: 4.4 MMOL/L (ref 3.5–5.1)
PREALB SERPL-MCNC: 21.4 MG/DL (ref 20–40)
RBC # BLD: 3.23 M/UL (ref 3.86–4.86)

## 2021-10-14 RX ADMIN — IRON SUPPLEMENT SCH MG: 325 TABLET ORAL at 08:11

## 2021-10-14 RX ADMIN — Medication SCH TAB: at 08:10

## 2021-10-14 RX ADMIN — Medication SCH CAP: at 08:08

## 2021-10-14 RX ADMIN — ASPIRIN SCH MG: 81 TABLET, COATED ORAL at 08:09

## 2021-10-14 RX ADMIN — LEVOTHYROXINE SODIUM SCH MG: 0.1 TABLET ORAL at 06:20

## 2021-10-14 RX ADMIN — CHOLECALCIFEROL TAB 25 MCG (1000 UNIT) SCH UNIT: 25 TAB at 08:09

## 2021-10-14 RX ADMIN — DONEPEZIL HYDROCHLORIDE SCH MG: 5 TABLET ORAL at 08:10

## 2021-10-14 RX ADMIN — MEMANTINE HYDROCHLORIDE SCH MG: 10 TABLET ORAL at 08:11

## 2021-10-14 RX ADMIN — APIXABAN SCH MG: 2.5 TABLET, FILM COATED ORAL at 08:10

## 2021-10-14 RX ADMIN — Medication SCH MG: at 08:09

## 2021-10-14 RX ADMIN — Medication SCH PATCH: at 06:59

## 2021-10-14 RX ADMIN — Medication SCH CAP: at 08:09

## 2021-10-14 RX ADMIN — LORATADINE SCH MG: 10 TABLET ORAL at 08:10

## 2021-10-14 NOTE — R.PN
PROGRESS NOTES



ENCOUNTER DATE AND TIME:



10/14/2021 18:24 (CDT)



NAME



BEHNKE, MARY



YOB: 1940



DATE OF ADMISSION:



10/04/2021 14:25 (CDT)



PARKINSON'S DZ Hypertensive chronic kidney disease I12.9, type 2 diabetes E11.22, Lumbar spinal steno
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



Parkinsonism, debility



SUBJECTIVE:



Pt denied any depression.

Pt denied any Shortness of Breath.

She reports doing well with all therapy. She denies significant pain or other problems. She has no ne
w complaints.

WBC 3.8, Hgb 10.3, Plt 199, Crt 1.61, prealbumin 21.4. UA shows esterase 2+, bacteria > 50.

Ambulated 750' with independence using a rolling walker. Up and down 25 steps with independence.



VITAL SIGNS



SBP/DBP: 120/55

Pulse: 60

Resp: 16

Temperature: 98.1 F



MEDICATION ALLERGIES:



No Known Drug Allergies (NKDA)



ENVIRONMENTAL ALLERGIES:



- Substance Allergies

None Known

- Other Allergies

None Known



CONSULT:



Perform Neuro consult

Perform Primary care follow up to assess routine medical needs



NURSING:



- Shower

allowing shower

- Bladder

care per protocol

- Skin

care per protocol



ACTIVITIES



OOB only with supervision



THERAPIES:



- Dietary and Nutrition

Adequate Nutrition. Nutritional Education. Nutritional Supplements. 



- Occupational Therapy

Cognitive Retraining. Visual Perceptual Training. 



- Speech Therapy

Cognitive Training. Expressive Language Skills. Memory Strategies. Receptive Language Skills. Speech 
Intelligibility Training. 



- Physical Therapy

Evaluate and Treat. Gait Training. Balance Training. Transfer Training. LE Strengthening. Patient/Fam
anabel Education. 



PHYSICAL EXAM



- Gen

Alert and awake

Lying in bed

No apparent distress

Oriented to: person, time, and place

- Skin

No skin breakdown.



Normacephalic

- Eyes

No abnormalities

- ENMT

No abnormalities

- Neck

No abnormalities

- CVS

RRR

- Chest

No abnormalities

- Abd

Soft

- GI

nondistended



Deferred

- 

No abnormalities

- Ext

No significant edema

- MSK

4+/5 weakness in both lower extremities.

- Neuro

No focal deficits

- Psych

No abnormalities

- OTHER

She reports nontraumatic left hip and knee pain. Aggravated by lying on the left side.

Two view x-rays of the left hip and knee reveal no abnormalities.



ASSESSMENT:



Pt. is a 82 yo Right-handed female.On 01/31/2021 she was admitted to [facility] with reyna
gnosis PARKINSON'S DZ .Her impairment category is Neurologic Conditions 03 -  Parkinsonism (03.2).Pre
-morbidly, Pt. was independent/mod-I in Locomotion, Safety Awareness, Social Cognition, and Transfers
 Control; and she had good Balance, Transfers Control, Self-Care, Sphincter Control, Communication, a
nd Endurance.Currently, she has deficits of Locomotion, Social Cognition, Safety Awareness, Balance, 
Transfers Control, Sphincter Control, Self-Care, Endurance, and Communication.Pt. is now referred to 
[facility] for acute in-patient rehabilitation in order to maximize patient's 
functional independence in activities of daily living, strength, ROM, and mobility.- Rehab Goal

Patient has realistic goal of being discharged at assistance level 7-Ind to reside at Home with  Fami
ly/Relatives.

MDM/PLAN:



- Physical Therapy

 Gait dysfunction - to improve, our physical therapists will perform initial evaluation of pt's statu
s upon admission and devise an individualized program for Gait Training, and Wheel Chair mobility

 Inability to transfer - to improve, our physical therapists will perform initial evaluation of pt's 
status upon admission and devise an individualized program for Bed mobility

 Need for home safety evaluation - to improve, our physical therapists will perform initial evaluatio
n of pt's status upon admission and devise an individualized program for Home Evaluation

 Need in caregiver upon discharge - to improve, our physical therapists will perform initial evaluati
on of pt's status upon admission and devise an individualized program for Caregiver Training

 New precaution - to improve, our physical therapists will perform initial evaluation of pt's status 
upon admission and devise an individualized program for Patient precaution education

 Edema - to improve, our physical therapists will perform initial evaluation of pt's status upon admi
ssion and devise an individualized program for Elevation Training, and Lymphedema Therapy

 Poor balance - to improve, our physical therapists will perform initial evaluation of pt's status up
on admission and devise an individualized program for Balance Training

 Poor endurance - to improve, our physical therapists will perform initial evaluation of pt's status 
upon admission and devise an individualized program for Endurance Training

 Weakness - to improve, our physical therapists will perform initial evaluation of pt's status upon a
dmission and devise an individualized program for Aquatic Therapy, Neuromuscular Reeducation, and Str
engthening

 Achieving independence - to improve, our physical therapists will perform initial evaluation of pt's
 status upon admission and devise an individualized program for Community Reintegration Activities

- Occupational Therapy

 ADL deficits - to improve, our occupation therapists will perform initial evaluation of pt's status 
upon admission and devise an individualized program for Bathing, Bed mobility, Community Reintegratio
n, Cooking, Dressing, Eating, Fine Motor Skills, Grooming, Homemaking, Kitchen Mobility, Laundry, Pat
ient Education, Safety Awareness, Splinting - Positioning, Transfers(Toilet, Tub, Shower), and Wheel 
Chair Management

 Cognitive deficits - to improve, our occupation therapists will perform initial evaluation of pt's s
tatus upon admission and devise an individualized program for Cognition - orientation

 Need for care giver - to improve, our occupation therapists will perform initial evaluation of pt's 
status upon admission and devise an individualized program for Caregiver Training

 Weakness - to improve, our occupation therapists will perform initial evaluation of pt's status upon
 admission and devise an individualized program for Aquatic Therapy, Balance, Endurance, UE ROM, and 
UE strengthening

- Other

 See attached MAR (Medication Administration Record)

- Diet Type

Continue Regular

- Diet - Liquid Texture

Continue Regular

- Tube Feed

Continue N/A

- N/A

Perform Neuro consult

Perform Primary care follow up to assess routine medical needs

- Diet - Solid Texture

Continue Regular

- Shower

 allowing shower

- Bladder

 care per protocol

- Skin

 care per protocol



FUNCTIONAL STATUS: UPDATED AT WEEKLY TEAM CONFERENCE



- Bladder

Same accident frequency: 7-Ind - No accidents in the past 7 days

- Bowel

Same accident frequency: 7-Ind - No accidents in the past 7 days

- Walking

Same score based on distance walked: 0(N/A)

- Wheelchair

Same score based on distance traveled: 0(N/A)



FUNCTIONAL STATUS:



- Self-Care

A. Eating    Ind   

B. Grooming    [place]   

C. Bathing    sup   

D. Dressing - Upper     sup   

E. Dressing - Lower     sup   

F. Toileting    sup   

- Sphincter Control

G. Bladder control     [place]   

H. Bowel control     [place]   

- Transfers Control

I. Bed/Chair/Wheelchair     Valentine   

J. Toilet    Valentine   

K. Tub/Shower    Valentine   

- Locomotion

L. Walk/Wheelchair (B)     sup   

M. Stairs    [place]entine   

- Communication

N. Comprehension (B)     sup   

O. Expression (B)     sup   

- Social Cognition

P. Social Interaction    Ind   

Q. Problem Solving    sup   

R. Memory    sup   

- Endurance

Good

- Balance

Fair

- Safety Awareness

Fair



QI SCORES:



- Self-Care

A. Eating  06-Independent  

B. Oral hygiene  03-Partial/moderate assistance  

C. Toileting hygiene  03-Partial/moderate assistance  

E. Shower/bathe self  03-Partial/moderate assistance  

F. Upper body dressing  03-Partial/moderate assistance  

G. Lower body dressing  03-Partial/moderate assistance  

H. Putting on/taking off footwear  88-Not attempted due to medical condition or safety concerns  

- Mobility

A. Roll left and right  03-Partial/moderate assistance  

B. Sit to lying  03-Partial/moderate assistance  

C. Lying to sitting on side of bed  03-Partial/moderate assistance  

D. Sit to stand  03-Partial/moderate assistance  

E. Chair/bed-to-chair transfer  88-Not attempted due to medical condition or safety concerns  

F. Toilet transfer  03-Partial/moderate assistance  

G. Car transfer  03-Partial/moderate assistance  

I. Walk 10 feet  03-Partial/moderate assistance  

J. Walk 50 feet with two turns  88-Not attempted due to medical condition or safety concerns  

K. Walk 150 feet  03-Partial/moderate assistance  

L. Walking 10 feet on uneven surfaces  88-Not attempted due to medical condition or safety concerns  


M. 1 step (curb)  88-Not attempted due to medical condition or safety concerns  

N. 4 steps  88-Not attempted due to medical condition or safety concerns  

O. 12 steps  88-Not attempted due to medical condition or safety concerns  

P. Picking up object  88-Not attempted due to medical condition or safety concerns  

R. Wheel 50 feet with two turns  88-Not attempted due to medical condition or safety concerns  

S. Wheel 150 feet  88-Not attempted due to medical condition or safety concerns  

- Bladder and Bowel

Bladder continence      

Bowel continence      

- Endurance

Fair

- Balance

Fair

- Safety Awareness

Fair



CURRENT [facility]. DEFICITS:



Mobility, Endurance, Balance, Safety Awareness, and Self-Care



SIGNATURE PANEL:



Electronically signed by Dr. Jordy Lomeli M.D. on 10/14/2021 at 18:26 (CDT)